# Patient Record
Sex: FEMALE | Employment: PART TIME | URBAN - METROPOLITAN AREA
[De-identification: names, ages, dates, MRNs, and addresses within clinical notes are randomized per-mention and may not be internally consistent; named-entity substitution may affect disease eponyms.]

---

## 2017-04-03 ENCOUNTER — TELEPHONE (OUTPATIENT)
Dept: FAMILY MEDICINE | Facility: OTHER | Age: 47
End: 2017-04-03

## 2017-04-03 NOTE — TELEPHONE ENCOUNTER
Summary:    Patient is due/failing the following:   PAP    Action needed:   Patient needs office visit for PAP.    Type of outreach:    Phone, left message for patient to call back.     Questions for provider review:    None                                                                                                                                    Annette Bernstein       Chart routed to Care Team .          Panel Management Review      Patient has the following on her problem list: None      Composite cancer screening  Chart review shows that this patient is due/due soon for the following Pap Smear

## 2017-04-18 ENCOUNTER — OFFICE VISIT (OUTPATIENT)
Dept: FAMILY MEDICINE | Facility: OTHER | Age: 47
End: 2017-04-18
Payer: COMMERCIAL

## 2017-04-18 VITALS
TEMPERATURE: 100.1 F | OXYGEN SATURATION: 97 % | RESPIRATION RATE: 20 BRPM | HEIGHT: 67 IN | HEART RATE: 90 BPM | DIASTOLIC BLOOD PRESSURE: 78 MMHG | BODY MASS INDEX: 41.59 KG/M2 | SYSTOLIC BLOOD PRESSURE: 110 MMHG | WEIGHT: 265 LBS

## 2017-04-18 DIAGNOSIS — R53.81 MALAISE: ICD-10-CM

## 2017-04-18 DIAGNOSIS — J20.9 ACUTE BRONCHITIS WITH SYMPTOMS > 10 DAYS: Primary | ICD-10-CM

## 2017-04-18 LAB
FLUAV+FLUBV AG SPEC QL: NEGATIVE
FLUAV+FLUBV AG SPEC QL: NORMAL
SPECIMEN SOURCE: NORMAL

## 2017-04-18 PROCEDURE — 87804 INFLUENZA ASSAY W/OPTIC: CPT | Performed by: PHYSICIAN ASSISTANT

## 2017-04-18 PROCEDURE — 99213 OFFICE O/P EST LOW 20 MIN: CPT | Performed by: PHYSICIAN ASSISTANT

## 2017-04-18 RX ORDER — BENZONATATE 200 MG/1
200 CAPSULE ORAL 3 TIMES DAILY PRN
Qty: 30 CAPSULE | Refills: 0 | Status: SHIPPED | OUTPATIENT
Start: 2017-04-18 | End: 2018-01-25

## 2017-04-18 RX ORDER — AZITHROMYCIN 250 MG/1
TABLET, FILM COATED ORAL
Qty: 6 TABLET | Refills: 0 | Status: SHIPPED | OUTPATIENT
Start: 2017-04-18 | End: 2018-01-25

## 2017-04-18 NOTE — PROGRESS NOTES
"  SUBJECTIVE:                                                    Jerrica Pappas is a 46 year old female who presents to clinic today for the following health issues:      HPI    Acute Illness   Acute illness concerns: Flu  Onset: friday    Fever: YES, 101    Chills/Sweats: YES    Headache (location?): YES    Sinus Pressure:YES    Conjunctivitis:  no    Ear Pain: no    Rhinorrhea: YES    Congestion: YES    Sore Throat: YES     Cough: YES-productive of brown sputum    Wheeze: YES    Decreased Appetite: YES    Nausea: no     Vomiting: no    Diarrhea:  YES- once    Dysuria/Freq.: no    Fatigue/Achiness: YES    Sick/Strep Exposure: YES- son had influenza     Therapies Tried and outcome: Advil - helps with fever and body aches       Problem list and histories reviewed & adjusted, as indicated.  Additional history: as documented    Labs reviewed in EPIC    ROS:  Constitutional, HEENT, cardiovascular, pulmonary, gi and gu systems are negative, except as otherwise noted.    OBJECTIVE:                                                    /78 (BP Location: Left arm, Patient Position: Chair, Cuff Size: Adult Large)  Pulse 90  Temp 100.1  F (37.8  C) (Oral)  Resp 20  Ht 5' 6.85\" (1.698 m)  Wt 265 lb (120.2 kg)  SpO2 97%  BMI 41.69 kg/m2  Body mass index is 41.69 kg/(m^2).  GENERAL APPEARANCE: ill appearing, alert and no distress  EYES: Eyes grossly normal to inspection, PERRLA, conjunctivae and sclerae without injection or discharge, EOM intact   HENT: Bilateral ear canals without erythema or cerumen, bilateral TM's pearly grey with normal light reflex, no effusion, injection, or bulging, nasal turbinates without swelling, erythema, or discharge, mouth without ulcers or lesions, oropharynx clear and oral mucous membranes moist, no sinus tenderness   NECK: Bilateral anterior cervical adenopathy. No adenopathy in posterior cervical or supraclavicular regions  RESP: Bronchial areas with diffuse rhonchi, remainder of " lungs clear to auscultation - no rales, rhonchi or wheezes    CV: Regular rates and rhythm, normal S1 S2, no S3 or S4, no murmur, click or rub  MS: No musculoskeletal defects are noted and gait is age appropriate without ataxia   SKIN: No suspicious lesions or rashes, hydration status appears adeuqate with normal skin turgor   PSYCH: Alert and oriented x3; speech- coherent , normal rate and volume; able to articulate logical thoughts, able to abstract reason, no tangential thoughts, no hallucinations or delusions, mentation appears normal, Mood is euthymic. Affect is appropriate for this mood state. Thought content is free of suicidal ideation, hallucinations, and delusions. Dress is adequate and upkept. Eye contact is good during conversation.       Diagnostic Test Results:  Results for orders placed or performed in visit on 04/18/17 (from the past 24 hour(s))   Influenza A/B antigen   Result Value Ref Range    Influenza A/B Agn Specimen Nasal     Influenza A Negative NEG    Influenza B  NEG     Negative   Test results must be correlated with clinical data. If necessary, results   should be confirmed by a molecular assay or viral culture.            ASSESSMENT/PLAN:                                                        ICD-10-CM    1. Acute bronchitis with symptoms > 10 days J20.9 azithromycin (ZITHROMAX) 250 MG tablet     benzonatate (TESSALON) 200 MG capsule   2. Malaise R53.81 Influenza A/B antigen     - Discussed lab findings   - Due to exam findings, will treat   - Discussed antibiotic use, duration, and side effects  - Discussed cough medication use and side effects  - Rest, fluids, and can continue Advil as needed   - Hand out given    The patient indicates understanding of these issues and agrees with the plan.    Follow up: CALLIE Wheeler PA-C  [facility]

## 2017-04-18 NOTE — PATIENT INSTRUCTIONS
Acute Bronchitis                  What is acute bronchitis?   Bronchitis is an infection of the air passages--that is, the tubes that connect the windpipe to the lungs. It causes swelling and irritation of the airways. With acute bronchitis you usually have a cough that produces phlegm and pain behind the breastbone when you breathe deeply or cough.   How does it occur?   Bronchitis often occurs with viral infections of the respiratory tract, such as colds and flu. Bronchitis may also be caused by bacterial infections. It may occur with childhood illnesses such as measles and whooping cough.   Attacks are most frequent during the winter or when the level of air pollution is high.   Infants, young children, older adults, smokers, and people with heart disease or lung disease (including asthma and allergies) are most likely to get acute bronchitis.   What are the symptoms?   Symptoms may include:   a deep cough that produces yellowish or greenish phlegm   pain behind the breastbone when you breathe deeply or cough   wheezing   feeling short of breath   fever   chills   headache   sore muscles.   How is it diagnosed?   Your healthcare provider will ask about your symptoms and examine you. You may have tests, such as:   a test of phlegm to look for bacteria   chest X-ray   blood tests.   How is it treated?   Acute bronchitis often does not require medical treatment. Resting at home and drinking plenty of fluids to keep the mucus loose may be all you need to do to get better in a few days. If your symptoms are severe or you have other health problems (such as heart or lung disease or diabetes), you may need to take antibiotics.   How long will the effects last?   Most of the time acute bronchitis clears up in a few days. Your cough may slowly get better in 1 to 2 weeks.   It may take you longer to recover if:   You are a smoker.   You live in an area where air pollution is a problem.   You have a heart  or lung disease.   You have any other continuing health problems.   How can I take care of myself?   You can help yourself by:   following the full treatment your healthcare provider recommends   using a vaporizer, humidifier, or steam from hot water to add moisture to the air   drinking plenty of liquids   taking cough medicine if recommended by your healthcare provider   resting in bed   taking aspirin or acetaminophen to reduce fever and relieve headache and muscle pain (Check with your healthcare provider before you give any medicine that contains aspirin or salicylates to a child or teen. This includes medicines like baby aspirin, some cold medicines, and Pepto Bismol. Children and teens who take aspirin are at risk for a serious illness called Reye's syndrome.)   eating healthy meals.   Call your healthcare provider if:   You have trouble breathing.   You have a fever of 101.5?F (38.6?C) or higher.   You cough up blood.   Your symptoms are getting worse instead of better.   You don't start to feel better after 3 days of treatment.   You have any symptoms that concern you.   How can I help prevent acute bronchitis?   To reduce your risk of getting a respiratory infection:   Do not smoke.   Wash your hands often, especially when you are around people with colds (upper respiratory infections).   If you have asthma or allergies, keep your symptoms under good control.   Get regular exercise.   Eat healthy foods.       Published by [employer].  This content is reviewed periodically and is subject to change as new health information becomes available. The information is intended to inform and educate and is not a replacement for medical evaluation, advice, diagnosis or treatment by a healthcare professional.   Developed by [employer].   ? 2010 [employer] and/or its affiliates. All Rights Reserved.   Copyright   [employer] 2011

## 2017-04-18 NOTE — LETTER
[address], [address]   [place]   [address]  Phone: [phone]      April 18, 2017            To Whom It May Concern:    RE:  Jerrica Pappas    Jerrica Pappas was seen and treated today at our clinic.  Due to an acute illness, patient is not to return to work until on antibiotics >24 hours and no fever.     Please contact me with any questions or concerns.    Sincerely,            Renetta Aldana-LEXIE Bertrand  April 18, 2017

## 2017-04-18 NOTE — NURSING NOTE
"No chief complaint on file.      Initial /78 (BP Location: Left arm, Patient Position: Chair, Cuff Size: Adult Large)  Pulse 90  Temp 100.1  F (37.8  C) (Oral)  Resp 20  Ht 5' 6.85\" (1.698 m)  Wt 265 lb (120.2 kg)  SpO2 97%  BMI 41.69 kg/m2 Estimated body mass index is 41.69 kg/(m^2) as calculated from the following:    Height as of this encounter: 5' 6.85\" (1.698 m).    Weight as of this encounter: 265 lb (120.2 kg).  Medication Reconciliation: complete  "

## 2017-04-20 ENCOUNTER — TELEPHONE (OUTPATIENT)
Dept: FAMILY MEDICINE | Facility: OTHER | Age: 47
End: 2017-04-20

## 2017-04-20 DIAGNOSIS — J20.9 ACUTE BRONCHITIS WITH SYMPTOMS > 10 DAYS: Primary | ICD-10-CM

## 2017-04-20 RX ORDER — CODEINE PHOSPHATE AND GUAIFENESIN 10; 100 MG/5ML; MG/5ML
2 SOLUTION ORAL EVERY 6 HOURS PRN
Qty: 120 ML | Refills: 0 | Status: SHIPPED | OUTPATIENT
Start: 2017-04-20 | End: 2018-01-25

## 2017-04-20 NOTE — LETTER
[address], [address]   [place]   [address]  Phone: [phone]      April 20, 2017            To Whom It May Concern:    RE:  Jerrica Pappas was seen and treated today at our clinic.  Due to an acute illness, the patient is not to return to work today (4/20/17) or tomorrow (4/21/17) due to symptoms and contagious etiology.         Please contact me with any questions or concerns.    Sincerely,            Renetta Wheeler PA-C  April 20, 2017

## 2017-04-20 NOTE — TELEPHONE ENCOUNTER
RX and letter written and placed in MA task. Excused her from work today and tomorrow.     Cedrick Wheeler PA-C  [facility]

## 2017-04-20 NOTE — TELEPHONE ENCOUNTER
[facility] phone call message- patient reporting a symptom:    Symptom or request: still not feeling well, still has the side pain     Duration (how long have symptoms been present): ongoing   Have you been treated for this before? Yes, on Tuesday   Additional comments: Zpack does not seem to be helping. Anything else she can do? Please advise.     Call taken on 4/20/2017 at 10:26 AM by Nury Mccullough

## 2017-04-20 NOTE — TELEPHONE ENCOUNTER
CDL: Patient called in stating her right sided pain has not improved since LOV 04/18/2017. Pain from her right ribcage down to the right hip, tender to the touch, worse with coughing. Wondering if she is able to get cough syrup with codeine to decrease the discomfort and a note excusing her from work with a date of when she can return. Please review and advise.     Jerrica Pappas is a 46 year old female who calls with side pain .    NURSING ASSESSMENT:  Description:  Having right side pain with her cough. Taking ibuprofen 600mg three times a day. Right ribcage to her hip is painful and tender to the touch and worse with coughing. Stated it is worse when you apply pressure. Cough is improving. Denies feeling faint, lightheaded, short of breath, nausea, vomiting, fevers. Patient is wondering if she is able to get a cough syrup with codeine to decrease the pain and a note stating when she should return to work. She does not feel that she can go to work tomorrow due to the pain.   Onset/duration:  Last Friday  Pain scale (0-10)   5-10/10 tender to the touch, painful 5/10 while taking ibuprofen and 10/10 when medication wears off  LMP/preg/breast feeding:  No LMP recorded. Patient has had an implant.  Last exam/Treatment:  04/18/2017  Allergies: No Known Allergies    NURSING PLAN: Routed to provider Yes    RECOMMENDED DISPOSITION:  Home care advice - per abdominal pain protocol  Will comply with recommendation: Yes  If further questions/concerns or if symptoms do not improve, worsen or new symptoms develop, call your PCP or [facility] as soon as possible.    NOTES:  Disposition was determined by the first positive assessment question, therefore all previous assessment questions were negative    Guideline used:  Telephone Triage Protocols for Nurses, Fourth Edition, Kadi Lott  Abdominal Pain  Nursing Judgment  Routed to Provider    Ana Hidalgo RN

## 2017-07-08 ENCOUNTER — HEALTH MAINTENANCE LETTER (OUTPATIENT)
Age: 47
End: 2017-07-08

## 2017-07-18 ENCOUNTER — TELEPHONE (OUTPATIENT)
Dept: FAMILY MEDICINE | Facility: OTHER | Age: 47
End: 2017-07-18

## 2017-07-18 NOTE — TELEPHONE ENCOUNTER
Summary:    Patient is due/failing the following:   PAP    Action needed:   Patient needs office visit for PAP.    Type of outreach:    Phone, spoke to patient.  patient declines at this time due to not having insurance. Patient will call to see if she is able to get coverage for the [place] program     Questions for provider review:    None                                                                                                                                    Annette Bernstein       Chart routed to Care Team .      Panel Management Review      Patient has the following on her problem list: None      Composite cancer screening  Chart review shows that this patient is due/due soon for the following Pap Smear

## 2017-08-31 ENCOUNTER — TELEPHONE (OUTPATIENT)
Dept: FAMILY MEDICINE | Facility: OTHER | Age: 47
End: 2017-08-31

## 2017-08-31 DIAGNOSIS — E03.9 ACQUIRED HYPOTHYROIDISM: ICD-10-CM

## 2017-08-31 RX ORDER — LEVOTHYROXINE SODIUM 112 UG/1
112 TABLET ORAL DAILY
Qty: 30 TABLET | Refills: 0 | Status: SHIPPED | OUTPATIENT
Start: 2017-08-31 | End: 2017-11-08

## 2017-08-31 NOTE — TELEPHONE ENCOUNTER
Reason for call:  Medication  Reason for Call:  Medication or medication refill:    Do you use a [facility]?  Name of the pharmacy and phone number for the current request:  [facility]    Name of the medication requested: levothyroxine (SYNTHROID, LEVOTHROID) 112 MCG tablet    Other request: na    Can we leave a detailed message on this number? YES    Phone number patient can be reached at: Home number on file [phone] (home)    Best Time: anytime     Call taken on 8/31/2017 at 1:19 PM by Raquel Gunn

## 2017-08-31 NOTE — TELEPHONE ENCOUNTER
Medication is being filled for 1 time refill only due to:  Patient needs labs TSH.     Orders placed.  Please call pt to schedule a lab only appointment.    Gisel Scott RN

## 2017-08-31 NOTE — TELEPHONE ENCOUNTER
PHARMACY NOTE: SECOND REQUEST      levothyroxine (SYNTHROID, LEVOTHROID) 112 MCG tablet     Last Written Prescription Date: 8/11/16  Last Quantity: 90, # refills: 3  Last Office Visit with [facility], P or [facility] prescribing provider: 4/18/17        TSH   Date Value Ref Range Status   07/28/2016 1.69 0.40 - 4.00 mU/L Final

## 2017-09-01 NOTE — TELEPHONE ENCOUNTER
Spoke with pt and told her refill was sent yesterday to the pharmacy but is due for repeat labs prior to next refill. Pt is scheduled for labs next week.    Almaz Wilkinson CMA ([facility])

## 2017-09-07 DIAGNOSIS — E03.9 ACQUIRED HYPOTHYROIDISM: ICD-10-CM

## 2017-09-07 LAB — TSH SERPL DL<=0.005 MIU/L-ACNC: 3.47 MU/L (ref 0.4–4)

## 2017-09-07 PROCEDURE — 84443 ASSAY THYROID STIM HORMONE: CPT | Performed by: PHYSICIAN ASSISTANT

## 2017-09-07 PROCEDURE — 36415 COLL VENOUS BLD VENIPUNCTURE: CPT | Performed by: PHYSICIAN ASSISTANT

## 2017-11-08 ENCOUNTER — TELEPHONE (OUTPATIENT)
Dept: FAMILY MEDICINE | Facility: OTHER | Age: 47
End: 2017-11-08

## 2017-11-08 DIAGNOSIS — E03.9 ACQUIRED HYPOTHYROIDISM: ICD-10-CM

## 2017-11-08 RX ORDER — LEVOTHYROXINE SODIUM 112 UG/1
TABLET ORAL
Qty: 30 TABLET | Refills: 0 | Status: SHIPPED | OUTPATIENT
Start: 2017-11-08 | End: 2018-01-25

## 2017-11-08 NOTE — TELEPHONE ENCOUNTER
levothyroxine (SYNTHROID/LEVOTHROID) 112 MCG tablet  Medication is being filled for 1 time refill only due to:  Patient needs to be seen because due for physical.      TSH   Date Value Ref Range Status   09/07/2017 3.47 0.40 - 4.00 mU/L Final   ]  Due for yearly physical, please assist with scheduling.   Ana Hidalgo RN, BSN

## 2017-11-16 ENCOUNTER — TELEPHONE (OUTPATIENT)
Dept: FAMILY MEDICINE | Facility: OTHER | Age: 47
End: 2017-11-16

## 2017-11-16 NOTE — TELEPHONE ENCOUNTER
Summary:    Patient is due/failing the following:   MAMMOGRAM, PAP and PHYSICAL    Action needed:   Patient needs office visit for PAP/PE. and schedule a mammogram     Type of outreach:    Phone, spoke to patient.  patient has no insurance   Patient was given price estimate number   Questions for provider review:    None                                                                                                                                    Annette Bernstein       Chart routed to Care Team .      Panel Management Review      Patient has the following on her problem list: None      Composite cancer screening  Chart review shows that this patient is due/due soon for the following Pap Smear and Mammogram

## 2018-01-19 NOTE — PROGRESS NOTES
SUBJECTIVE:   CC: Jerrica Pappas is an 47 year old woman who presents for preventive health visit.     Fasting labs first please -CDL       Physical   Annual:     Getting at least 3 servings of Calcium per day::  Yes    Bi-annual eye exam::  Yes    Dental care twice a year::  Yes    Sleep apnea or symptoms of sleep apnea::  None    Diet::  Regular (no restrictions)    Frequency of exercise::  2-3 days/week    Duration of exercise::  15-30 minutes    Taking medications regularly::  Yes    Medication side effects::  None    Additional concerns today::  YES (would like to have A1c checked today, sisters have both been dx'ed with DM)              Hypothyroidism Follow-up      Since last visit, patient describes the following symptoms: weight gain of 10-15 lbs      Today's PHQ-2 Score:   PHQ-2 ( 1999 Pfizer) 12/14/2016   Q1: Little interest or pleasure in doing things 0   Q2: Feeling down, depressed or hopeless 0   PHQ-2 Score 0       Abuse: Current or Past(Physical, Sexual or Emotional)- No  Do you feel safe in your environment - Yes    Social History   Substance Use Topics     Smoking status: Never Smoker     Smokeless tobacco: Never Used     Alcohol use No     No flowsheet data found.No flowsheet data found.    Reviewed orders with patient.  Reviewed health maintenance and updated orders accordingly - Yes  Labs reviewed in EPIC  BP Readings from Last 3 Encounters:   04/18/17 110/78   12/14/16 118/70   11/28/16 118/70    Wt Readings from Last 3 Encounters:   04/18/17 265 lb (120.2 kg)   12/14/16 264 lb (119.7 kg)   11/28/16 266 lb 6.4 oz (120.8 kg)            Patient under age 50, mutual decision reflected in health maintenance.      Pertinent mammograms are reviewed under the imaging tab.  History of abnormal Pap smear: NO - age 30-65 PAP every 5 years with negative HPV co-testing recommended    Reviewed and updated as needed this visit by clinical staff  Tobacco  Allergies  Meds  Problems  Med Hx  Surg Hx   "Fam Hx  Soc Hx          Reviewed and updated as needed this visit by Provider  Allergies  Meds  Problems        Past Medical History:   Diagnosis Date     Thyroid disorder       Past Surgical History:   Procedure Laterality Date     LAPAROTOMY MINI, TUBAL LIGATION, COMBINED       OTHER SURGICAL HISTORY      cyst on ears        Review of Systems     OBJECTIVE:   /82  Pulse 74  Temp 98.4  F (36.9  C) (Oral)  Resp 12  Ht 5' 6.81\" (1.697 m)  Wt 274 lb (124.3 kg)  SpO2 99%  BMI 43.16 kg/m2  Physical Exam   Constitutional: She is oriented to person, place, and time. She appears well-developed and well-nourished. No distress.   HENT:   Right Ear: External ear normal.   Left Ear: External ear normal.   Nose: Nose normal.   Mouth/Throat: Oropharynx is clear and moist. No oropharyngeal exudate.   Eyes: Conjunctivae are normal. Pupils are equal, round, and reactive to light. Right eye exhibits no discharge. Left eye exhibits no discharge.   Neck: Neck supple. No tracheal deviation present. No thyromegaly present.   Cardiovascular: Normal rate, regular rhythm, S1 normal, S2 normal, normal heart sounds and normal pulses.  Exam reveals no S3, no S4 and no friction rub.    No murmur heard.  Pulmonary/Chest: Effort normal and breath sounds normal. No respiratory distress. She has no wheezes. She has no rales.   Abdominal: Soft. Bowel sounds are normal. She exhibits no mass. There is no hepatosplenomegaly. There is no tenderness.   Genitourinary: Vagina normal and uterus normal. No breast swelling, tenderness or discharge. Cervix exhibits no motion tenderness and no discharge. No vaginal discharge found.   Musculoskeletal: Normal range of motion. She exhibits no edema.   Lymphadenopathy:     She has no cervical adenopathy.   Neurological: She is alert and oriented to person, place, and time. She has normal strength and normal reflexes. She exhibits normal muscle tone.   Skin: Skin is warm and dry. No rash noted. " "  Psychiatric: She has a normal mood and affect. Judgment and thought content normal. Cognition and memory are normal.     - Exam limited due to body habitus, especially abdominal and pelvic bimanual        ASSESSMENT/PLAN:       ICD-10-CM    1. Encounter for routine adult health examination without abnormal findings Z00.00    2. Visit for screening mammogram Z12.31 MA SCREENING DIGITAL BILAT - Future  (s+30)   3. Screening for malignant neoplasm of cervix Z12.4 Pap imaged thin layer screen with HPV - recommended age 30 - 65 years (select HPV order below)     HPV High Risk Types DNA Cervical   4. Need for prophylactic vaccination and inoculation against influenza Z23    5. Acquired hypothyroidism E03.9 TSH with free T4 reflex   6. CARDIOVASCULAR SCREENING; LDL GOAL LESS THAN 160 Z13.6 GLUCOSE     Lipid panel reflex to direct LDL Fasting   7. Obesity, Class III, BMI 40-49.9 (morbid obesity) (H) E66.01 GLUCOSE     Lipid panel reflex to direct LDL Fasting     - Discussed measuring pulse for exercise, going only for leisurely stroll and not actually \"exercising\"   - Discussed Choose My Plate and Hand out Given   - Referral to nutrition placed for diet help   - Lab today, await results   - Patient to schedule mammogram       COUNSELING:  Reviewed preventive health counseling, as reflected in patient instructions  Special attention given to:        Regular exercise       Healthy diet/nutrition       Immunizations    Declined: Influenza due to Concerns about side effects/safety             reports that she has never smoked. She has never used smokeless tobacco.    Estimated body mass index is 41.69 kg/(m^2) as calculated from the following:    Height as of 4/18/17: 5' 6.85\" (1.698 m).    Weight as of 4/18/17: 265 lb (120.2 kg).   Weight management plan: Discussed healthy diet and exercise guidelines and patient will follow up in 12 months in clinic to re-evaluate.    Counseling Resources:  ATP IV Guidelines  Pooled Cohorts " Equation Calculator  Breast Cancer Risk Calculator  FRAX Risk Assessment  ICSI Preventive Guidelines  Dietary Guidelines for Americans, 2010  USDA's MyPlate  ASA Prophylaxis  Lung CA Screening        Renetta Aldana-LEXIE Bertrand  [facility]

## 2018-01-19 NOTE — PATIENT INSTRUCTIONS
- Schedule mammogram           Preventive Health Recommendations  Female Ages 40 to 49    Yearly exam:     See your health care provider every year in order to  1. Review health changes.   2. Discuss preventive care.    3. Review your medicines if your doctor prescribed any.      Get a Pap test every three years (unless you have an abnormal result and your provider advises testing more often).      If you get Pap tests with HPV test, you only need to test every 5 years, unless you have an abnormal result. You do not need a Pap test if your uterus was removed (hysterectomy) and you have not had cancer.      You should be tested each year for STDs (sexually transmitted diseases), if you're at risk.       Ask your doctor if you should have a mammogram.      Have a colonoscopy (test for colon cancer) if someone in your family has had colon cancer or polyps before age 50.       Have a cholesterol test every 5 years.       Have a diabetes test (fasting glucose) after age 45. If you are at risk for diabetes, you should have this test every 3 years.    Shots: Get a flu shot each year. Get a tetanus shot every 10 years.     Nutrition:     Eat at least 5 servings of fruits and vegetables each day.    Eat whole-grain bread, whole-wheat pasta and brown rice instead of white grains and rice.    Talk to your provider about Calcium and Vitamin D.     Lifestyle    Exercise at least 150 minutes a week (an average of 30 minutes a day, 5 days a week). This will help you control your weight and prevent disease.    Limit alcohol to one drink per day.    No smoking.     Wear sunscreen to prevent skin cancer.    See your dentist every six months for an exam and cleaning.        Exercise: Measuring Your Pace  Getting your heart to work at the right pace is important. It means you ll develop better aerobic endurance. This happens because your heart gets stronger and more efficient from the challenge. A stronger heart can pump more oxygen  to your muscles. Then you don t get tired as quickly when doing hobbies, sports, or daily activities.    How does it feel?  Listening to your body is the best way to decide if an exercise pace is right for you. If you do too much, you ll get tired too quickly or get hurt. If you do too little, you won t get the health rewards you want.  Note: During or after exercise, you should not feel sick, dizzy, or very tired. After you re done, you should feel normal again in about 10 minutes. If you re very tired or very sore the next day, you may have exercised too hard.  Your target heart rate  When you exercise, you should keep your heart rate within a safe range. This is called your target heart rate range. Your heart rate is measured by taking your pulse. (If you don t know how to take your pulse, ask your healthcare provider to show you how.) Take your pulse often as you exercise. This helps to make sure you re within your target heart rate range. When you exercise at the right heart rate, you will get the most out of your exercise safely.  Finding your target heart rate  Your target heart rate is based on your age and health. It is important to ask your healthcare provider how much exercise is safe for you. If you re healthy, you can use the chart to find your target heart rate range. If your heart rate goes too high, slow down until you re back in your range.     Age  ?(Years) Target Heart   Rate Range  (Beats per minute) Maximum  Heart Rate  (Do not exceed)   20 100-150 200   25  195   30  190   35  185   40  180   45  175   50  170   55  165   60  160   Date Last Reviewed: 8/13/2015 2000-2017 [employer]. [address] [place] [address], [place] [address]. All rights reserved. This information is not intended as a substitute for professional medical care. Always follow your healthcare professional's instructions.

## 2018-01-25 ENCOUNTER — OFFICE VISIT (OUTPATIENT)
Dept: FAMILY MEDICINE | Facility: OTHER | Age: 48
End: 2018-01-25

## 2018-01-25 VITALS
SYSTOLIC BLOOD PRESSURE: 116 MMHG | BODY MASS INDEX: 43 KG/M2 | HEART RATE: 74 BPM | TEMPERATURE: 98.4 F | DIASTOLIC BLOOD PRESSURE: 82 MMHG | WEIGHT: 274 LBS | RESPIRATION RATE: 12 BRPM | HEIGHT: 67 IN | OXYGEN SATURATION: 99 %

## 2018-01-25 DIAGNOSIS — Z12.4 SCREENING FOR MALIGNANT NEOPLASM OF CERVIX: ICD-10-CM

## 2018-01-25 DIAGNOSIS — Z23 NEED FOR PROPHYLACTIC VACCINATION AND INOCULATION AGAINST INFLUENZA: ICD-10-CM

## 2018-01-25 DIAGNOSIS — Z12.31 VISIT FOR SCREENING MAMMOGRAM: ICD-10-CM

## 2018-01-25 DIAGNOSIS — Z13.6 CARDIOVASCULAR SCREENING; LDL GOAL LESS THAN 160: ICD-10-CM

## 2018-01-25 DIAGNOSIS — Z00.00 ENCOUNTER FOR ROUTINE ADULT HEALTH EXAMINATION WITHOUT ABNORMAL FINDINGS: Primary | ICD-10-CM

## 2018-01-25 DIAGNOSIS — E03.9 ACQUIRED HYPOTHYROIDISM: ICD-10-CM

## 2018-01-25 DIAGNOSIS — E66.01 MORBID OBESITY (H): ICD-10-CM

## 2018-01-25 DIAGNOSIS — Z83.3 FAMILY HISTORY OF DIABETES MELLITUS: ICD-10-CM

## 2018-01-25 LAB
CHOLEST SERPL-MCNC: 113 MG/DL
GLUCOSE SERPL-MCNC: 93 MG/DL (ref 70–99)
HBA1C MFR BLD: 5.9 % (ref 4.3–6)
HDLC SERPL-MCNC: 46 MG/DL
LDLC SERPL CALC-MCNC: 61 MG/DL
NONHDLC SERPL-MCNC: 67 MG/DL
TRIGL SERPL-MCNC: 31 MG/DL
TSH SERPL DL<=0.005 MIU/L-ACNC: 3.81 MU/L (ref 0.4–4)

## 2018-01-25 PROCEDURE — 99386 PREV VISIT NEW AGE 40-64: CPT | Performed by: PHYSICIAN ASSISTANT

## 2018-01-25 PROCEDURE — 83036 HEMOGLOBIN GLYCOSYLATED A1C: CPT | Performed by: PHYSICIAN ASSISTANT

## 2018-01-25 PROCEDURE — 84443 ASSAY THYROID STIM HORMONE: CPT | Performed by: PHYSICIAN ASSISTANT

## 2018-01-25 PROCEDURE — G0145 SCR C/V CYTO,THINLAYER,RESCR: HCPCS | Performed by: PHYSICIAN ASSISTANT

## 2018-01-25 PROCEDURE — 36415 COLL VENOUS BLD VENIPUNCTURE: CPT | Performed by: PHYSICIAN ASSISTANT

## 2018-01-25 PROCEDURE — 87624 HPV HI-RISK TYP POOLED RSLT: CPT | Performed by: PHYSICIAN ASSISTANT

## 2018-01-25 PROCEDURE — 82947 ASSAY GLUCOSE BLOOD QUANT: CPT | Performed by: PHYSICIAN ASSISTANT

## 2018-01-25 PROCEDURE — 80061 LIPID PANEL: CPT | Performed by: PHYSICIAN ASSISTANT

## 2018-01-25 RX ORDER — LEVOTHYROXINE SODIUM 112 UG/1
112 TABLET ORAL DAILY
Qty: 90 TABLET | Refills: 3 | Status: SHIPPED | OUTPATIENT
Start: 2018-01-25 | End: 2019-03-08

## 2018-01-25 NOTE — NURSING NOTE
"Chief Complaint   Patient presents with     Physical     Panel Management     mychart, height, flu, pap, mammo       Initial /82  Pulse 74  Temp 98.4  F (36.9  C) (Oral)  Resp 12  Ht 5' 6.81\" (1.697 m)  Wt 274 lb (124.3 kg)  SpO2 99%  BMI 43.16 kg/m2 Estimated body mass index is 43.16 kg/(m^2) as calculated from the following:    Height as of this encounter: 5' 6.81\" (1.697 m).    Weight as of this encounter: 274 lb (124.3 kg).  Medication Reconciliation: complete  "

## 2018-01-25 NOTE — MR AVS SNAPSHOT
After Visit Summary   1/25/2018    Jerrica Pappas    MRN: [id number]           Patient Information     Date Of Birth          1970        Visit Information        Provider Department      1/25/2018 9:00 AM Renetta Wheeler PA-C [facility]        Today's Diagnoses     Encounter for routine adult health examination without abnormal findings    -  1    Visit for screening mammogram        Screening for malignant neoplasm of cervix        Need for prophylactic vaccination and inoculation against influenza        Acquired hypothyroidism        CARDIOVASCULAR SCREENING; LDL GOAL LESS THAN 160        Morbid obesity (H)        Family history of diabetes mellitus          Care Instructions      - Schedule mammogram           Preventive Health Recommendations  Female Ages 40 to 49    Yearly exam:     See your health care provider every year in order to  1. Review health changes.   2. Discuss preventive care.    3. Review your medicines if your doctor prescribed any.      Get a Pap test every three years (unless you have an abnormal result and your provider advises testing more often).      If you get Pap tests with HPV test, you only need to test every 5 years, unless you have an abnormal result. You do not need a Pap test if your uterus was removed (hysterectomy) and you have not had cancer.      You should be tested each year for STDs (sexually transmitted diseases), if you're at risk.       Ask your doctor if you should have a mammogram.      Have a colonoscopy (test for colon cancer) if someone in your family has had colon cancer or polyps before age 50.       Have a cholesterol test every 5 years.       Have a diabetes test (fasting glucose) after age 45. If you are at risk for diabetes, you should have this test every 3 years.    Shots: Get a flu shot each year. Get a tetanus shot every 10 years.     Nutrition:     Eat at least 5 servings of fruits and vegetables each  day.    Eat whole-grain bread, whole-wheat pasta and brown rice instead of white grains and rice.    Talk to your provider about Calcium and Vitamin D.     Lifestyle    Exercise at least 150 minutes a week (an average of 30 minutes a day, 5 days a week). This will help you control your weight and prevent disease.    Limit alcohol to one drink per day.    No smoking.     Wear sunscreen to prevent skin cancer.    See your dentist every six months for an exam and cleaning.        Exercise: Measuring Your Pace  Getting your heart to work at the right pace is important. It means you ll develop better aerobic endurance. This happens because your heart gets stronger and more efficient from the challenge. A stronger heart can pump more oxygen to your muscles. Then you don t get tired as quickly when doing hobbies, sports, or daily activities.    How does it feel?  Listening to your body is the best way to decide if an exercise pace is right for you. If you do too much, you ll get tired too quickly or get hurt. If you do too little, you won t get the health rewards you want.  Note: During or after exercise, you should not feel sick, dizzy, or very tired. After you re done, you should feel normal again in about 10 minutes. If you re very tired or very sore the next day, you may have exercised too hard.  Your target heart rate  When you exercise, you should keep your heart rate within a safe range. This is called your target heart rate range. Your heart rate is measured by taking your pulse. (If you don t know how to take your pulse, ask your healthcare provider to show you how.) Take your pulse often as you exercise. This helps to make sure you re within your target heart rate range. When you exercise at the right heart rate, you will get the most out of your exercise safely.  Finding your target heart rate  Your target heart rate is based on your age and health. It is important to ask your healthcare provider how much  exercise is safe for you. If you re healthy, you can use the chart to find your target heart rate range. If your heart rate goes too high, slow down until you re back in your range.     Age  ?(Years) Target Heart   Rate Range  (Beats per minute) Maximum  Heart Rate  (Do not exceed)   20 100-150 200   25  195   30  190   35  185   40  180   45  175   50  170   55  165   60  160   Date Last Reviewed: 8/13/2015 2000-2017 [employer]. [address] [place] [address]. All rights reserved. This information is not intended as a substitute for professional medical care. Always follow your healthcare professional's instructions.                Follow-ups after your visit        Additional Services     NUTRITION REFERRAL       Your provider has referred you to: PREFERRED PROVIDERS:  [facility]: [facility] [phone]   [email or web address]/    Please be aware that coverage of these services is subject to the terms and limitations of your health insurance plan.  Call member services at your health plan with any benefit or coverage questions.      Please bring the following with you to your appointment:    (1) This referral request  (2) Any documents given to you regarding this referral  (3) Any specific questions you have about diet and/or food choices                  Future tests that were ordered for you today     Open Future Orders        Priority Expected Expires Ordered    MA SCREENING DIGITAL BILAT - Future  (s+30) Routine  1/19/2019 1/25/2018            Who to contact     If you have questions or need follow up information about today's clinic visit or your schedule please contact [facility] directly at [phone].  Normal or non-critical lab and imaging results will be communicated to you by MyChart, letter or phone within 4 business days after the clinic has received the  "results. If you do not hear from us within 7 days, please contact the clinic through [employer] or phone. If you have a critical or abnormal lab result, we will notify you by phone as soon as possible.  Submit refill requests through [employer] or call your pharmacy and they will forward the refill request to us. Please allow 3 business days for your refill to be completed.          Additional Information About Your Visit        [employer]ful Information     [employer] lets you send messages to your doctor, view your test results, renew your prescriptions, schedule appointments and more. To sign up, go to [email or web address] . Click on \"Log in\" on the left side of the screen, which will take you to the Welcome page. Then click on \"Sign up Now\" on the right side of the page.     You will be asked to enter the access code listed below, as well as some personal information. Please follow the directions to create your username and password.     Your access code is: [email or web address]  Expires: 2018  9:49 AM     Your access code will  in 90 days. If you need help or a new code, please call your [facility] clinic or [phone].        Care EveryWhere ID     This is your Care EveryWhere ID. This could be used by other organizations to access your [facility] medical records  [id number]        Your Vitals Were     Pulse Temperature Respirations Height Pulse Oximetry BMI (Body Mass Index)    74 98.4  F (36.9  C) (Oral) 12 5' 6.81\" (1.697 m) 99% 43.16 kg/m2       Blood Pressure from Last 3 Encounters:   18 116/82   17 110/78   16 118/70    Weight from Last 3 Encounters:   18 274 lb (124.3 kg)   17 265 lb (120.2 kg)   16 264 lb (119.7 kg)              We Performed the Following     GLUCOSE     Hemoglobin A1c     HPV High Risk Types DNA Cervical     Lipid panel reflex to direct LDL Fasting     NUTRITION REFERRAL     Pap imaged thin layer screen with HPV - recommended age 30 - 65 years (select HPV " order below)     TSH with free T4 reflex          Today's Medication Changes          These changes are accurate as of 1/25/18  9:49 AM.  If you have any questions, ask your nurse or doctor.               These medicines have changed or have updated prescriptions.        Dose/Directions    levothyroxine 112 MCG tablet   Commonly known as:  SYNTHROID/LEVOTHROID   This may have changed:  See the new instructions.   Used for:  Acquired hypothyroidism   Changed by:  Renetta Wheeler PA-C        Dose:  112 mcg   Take 1 tablet (112 mcg) by mouth daily   Quantity:  90 tablet   Refills:  3            Where to get your medicines      These medications were sent to [facility] [address] [place] [address] [facility] [address] [facility] [place] [facility] [address]  [address] [facility], [facility] [address]     Phone:  [phone]     levothyroxine 112 MCG tablet                Primary Care Provider Office Phone # Fax #    Renetta Wheeler PA-C [phone]       [address] [place] [address]   [facility] [address]        Equal Access to Services     [facility]: Hadii tae ku hadasho Soomaali, waaxda luqadaha, qaybta kaalmada adeegyafigueroa, guzman gómez . So [facility] [phone].    ATENCIÓN: Si habla español, tiene a blair disposición servicios gratuitos de asistencia lingüística. [facility] [phone].    We comply with applicable federal civil rights laws and [place] laws. We do not discriminate on the basis of race, color, national origin, age, disability, sex, sexual orientation, or gender identity.            Thank you!     Thank you for choosing [facility]  for your care. Our goal is always to provide you with excellent care. Hearing back from our patients is one way we can continue to improve our services. Please take a few minutes to complete the written survey that you may receive in the mail after your visit with us. Thank you!             Your Updated  Medication List - Protect others around you: Learn how to safely use, store and throw away your medicines at www.disposemymeds.org.          This list is accurate as of 1/25/18  9:49 AM.  Always use your most recent med list.                   Brand Name Dispense Instructions for use Diagnosis    levothyroxine 112 MCG tablet    SYNTHROID/LEVOTHROID    90 tablet    Take 1 tablet (112 mcg) by mouth daily    Acquired hypothyroidism

## 2018-01-25 NOTE — LETTER
February 7, 2018    Jerrica Pappas  [address] [place] [id number]    Dear Jerrica,  We are happy to inform you that your PAP smear result from 1/25/18 is normal.  We are now able to do a follow up test on PAP smears. The DNA test is for HPV (Human Papilloma Virus). Cervical cancer is closely linked with certain types of HPV. Your result showed no evidence of high risk HPV.  Therefore we recommend you return in 5 years for your next pap smear and HPV test.  You will still need to return to the clinic every year for an annual exam and other preventive tests.  Please contact the clinic at [phone] with any questions.  Sincerely,    Renetta Wheeler PA-C/archie

## 2018-01-29 LAB
COPATH REPORT: NORMAL
PAP: NORMAL

## 2018-01-30 LAB
FINAL DIAGNOSIS: NORMAL
HPV HR 12 DNA CVX QL NAA+PROBE: NEGATIVE
HPV16 DNA SPEC QL NAA+PROBE: NEGATIVE
HPV18 DNA SPEC QL NAA+PROBE: NEGATIVE
SPECIMEN DESCRIPTION: NORMAL
SPECIMEN SOURCE CVX/VAG CYTO: NORMAL

## 2018-08-29 ENCOUNTER — RADIANT APPOINTMENT (OUTPATIENT)
Dept: MAMMOGRAPHY | Facility: OTHER | Age: 48
End: 2018-08-29
Attending: PHYSICIAN ASSISTANT
Payer: COMMERCIAL

## 2018-08-29 DIAGNOSIS — Z12.31 VISIT FOR SCREENING MAMMOGRAM: ICD-10-CM

## 2018-08-29 PROCEDURE — 77067 SCR MAMMO BI INCL CAD: CPT | Mod: TC

## 2018-09-25 ENCOUNTER — OFFICE VISIT (OUTPATIENT)
Dept: OBGYN | Facility: OTHER | Age: 48
End: 2018-09-25
Payer: COMMERCIAL

## 2018-09-25 VITALS
HEART RATE: 72 BPM | BODY MASS INDEX: 43.12 KG/M2 | WEIGHT: 273.75 LBS | SYSTOLIC BLOOD PRESSURE: 120 MMHG | DIASTOLIC BLOOD PRESSURE: 76 MMHG

## 2018-09-25 DIAGNOSIS — Z30.46 NEXPLANON REMOVAL: Primary | ICD-10-CM

## 2018-09-25 PROCEDURE — 11982 REMOVE DRUG IMPLANT DEVICE: CPT | Performed by: ADVANCED PRACTICE MIDWIFE

## 2018-09-25 NOTE — PROGRESS NOTES
Jerrica Pappas is a 48 year old who presents to the clinic for removal of her nexplanon.   Uses TL for birth control and nexplanon for cycle control.  Has done an excellent job, no menses since insertion.   Is not interested in reinsertion.   Discussed average age at menopause is 52.  She prefers to wait to see what her menses are like before deciding on reinsertion.         Histories reviewed and updated  Past Medical History:   Diagnosis Date     Thyroid disorder      Past Surgical History:   Procedure Laterality Date     LAPAROTOMY MINI, TUBAL LIGATION, COMBINED       OTHER SURGICAL HISTORY      cyst on ears      Social History     Social History     Marital status:      Spouse name: N/A     Number of children: N/A     Years of education: N/A     Occupational History     Not on file.     Social History Main Topics     Smoking status: Never Smoker     Smokeless tobacco: Never Used     Alcohol use No     Drug use: No     Sexual activity: Yes     Partners: Male     Birth control/ protection: Surgical, Implant      Comment: tubal     Other Topics Concern     Not on file     Social History Narrative     Family History   Problem Relation Age of Onset     Hypertension Father      Psychotic Disorder Brother      ?schizophrenia, better on meds               EXAM:  /76 (BP Location: Right arm, Patient Position: Sitting, Cuff Size: Adult Large)  Pulse 72  Wt 273 lb 12 oz (124.2 kg)  BMI 43.12 kg/m2    The Nexplanon jaspreet was located in the left  arm.  The distal and proximal ends of the jaspreet were located and marked with a marking pen and the area cleansed with betadine. Approximately 3 cc's of 1% lidocaine with epinephrine was injected into the area under the Nexplanon jaspreet and a small skin incision made using a #11 blade.  The Nexplanon was gently manipulated until the tip was at the incision. The jaspreet tip was grasped with a  Carole clamp and was gently removed from the incision.  Both jaspreet tips were inspected  following removal and found to be completely intact.  The incision site was hemostatic and a steri-strip applied to the area along with a small pressure dressing.       ASSESSMENT/PLAN:    (Z30.46) Nexplanon removal  (primary encounter diagnosis)  Comment:   Plan:     RTC prn for reinsertion if menses resume.

## 2018-09-25 NOTE — MR AVS SNAPSHOT
After Visit Summary   9/25/2018    Jerrica Pappas    MRN: [id number]           Patient Information     Date Of Birth          1970        Visit Information        Provider Department      9/25/2018 2:15 PM Elida Farmer APRN CNM [facility]        Today's Diagnoses     Nexplanon removal    -  1      Care Instructions    Leave the pressure dressing in place for 4-6 hours.  If you feel the dressing is too tight loosen it or remove it completely.  Leave the Band-Aid in place for 24 hours.  Change it if wet.   Leave the steri strip in place until it falls off by itself.  Call the clinic with fever, chills or bleeding from the site.   Use a back up method of birth control such as condoms or abstain from intercourse for 7 days.   Call the clinic for bleeding that is heavier than a normal period for you or if you experience severe pelvic pain.              Follow-ups after your visit        Who to contact     If you have questions or need follow up information about today's clinic visit or your schedule please contact [facility] directly at [phone].  Normal or non-critical lab and imaging results will be communicated to you by MyChart, letter or phone within 4 business days after the clinic has received the results. If you do not hear from us within 7 days, please contact the clinic through MyChart or phone. If you have a critical or abnormal lab result, we will notify you by phone as soon as possible.  Submit refill requests through [employer] or call your pharmacy and they will forward the refill request to us. Please allow 3 business days for your refill to be completed.          Additional Information About Your Visit        Care EveryWhere ID     This is your Care EveryWhere ID. This could be used by other organizations to access your [facility] medical records  [id number]        Your Vitals Were     Pulse BMI (Body Mass Index)                72 43.12 kg/m2            Blood Pressure from Last 3 Encounters:   09/25/18 120/76   01/25/18 116/82   04/18/17 110/78    Weight from Last 3 Encounters:   09/25/18 273 lb 12 oz (124.2 kg)   01/25/18 274 lb (124.3 kg)   04/18/17 265 lb (120.2 kg)              We Performed the Following     REMOVAL [facility]        Primary Care Provider Office Phone # Fax #    Renetta MYRICK LEXIE Wheeler [phone]       [address] [facility] [address]  [facility] [address]        Equal Access to Services     [facility]: Hadii tae robles hadasho Soomaali, waaxda luqadaha, qaybta kaalmada adejsyada, guzman gómez . So [facility] [phone].    ATENCIÓN: Si habla español, tiene a blair disposición servicios gratuitos de asistencia lingüística. [facility] [phone].    We comply with applicable federal civil rights laws and [place] laws. We do not discriminate on the basis of race, color, national origin, age, disability, sex, sexual orientation, or gender identity.            Thank you!     Thank you for choosing [facility] [place]  for your care. Our goal is always to provide you with excellent care. Hearing back from our patients is one way we can continue to improve our services. Please take a few minutes to complete the written survey that you may receive in the mail after your visit with us. Thank you!             Your Updated Medication List - Protect others around you: Learn how to safely use, store and throw away your medicines at www.disposemymeds.org.          This list is accurate as of 9/25/18  2:39 PM.  Always use your most recent med list.                   Brand Name Dispense Instructions for use Diagnosis    levothyroxine 112 MCG tablet    SYNTHROID/LEVOTHROID    90 tablet    Take 1 tablet (112 mcg) by mouth daily    Acquired hypothyroidism

## 2018-09-25 NOTE — PATIENT INSTRUCTIONS
Leave the pressure dressing in place for 4-6 hours.  If you feel the dressing is too tight loosen it or remove it completely.  Leave the Band-Aid in place for 24 hours.  Change it if wet.   Leave the steri strip in place until it falls off by itself.  Call the clinic with fever, chills or bleeding from the site.   Use a back up method of birth control such as condoms or abstain from intercourse for 7 days.   Call the clinic for bleeding that is heavier than a normal period for you or if you experience severe pelvic pain.

## 2018-10-16 NOTE — PROGRESS NOTES
"  SUBJECTIVE:   Jerrica Pappas is a 48 year old female who presents to clinic today for the following health issues:    HPI  Joint Pain    Onset: about a month ago    Description:   Location: left knee  Character: Sharp    Intensity: 4/10 right now, took ibuprofen; 9/10 at its worst.     Progression of Symptoms: worse like it was before. Was seen in , did xrays and put her on prednisone. Prednisone helped until she was finished with it. Now the pain is the same as it was before she was on the medication.     Accompanying Signs & Symptoms:  Other symptoms: swelling but has come down.     History:   Previous similar pain: no       Precipitating factors:   Trauma or overuse: YES- being on her feet all day \"triggered it\"    Alleviating factors:  Improved by: ice, support wrap and Ibuprofen    Therapies Tried and outcome: See above.     - Prednisone helped for a couple of months.   - Was seen at [facility] on 07/01/2018 and had X-ray done  - X-ray results 07/01/2018:  FINDINGS:  No fracture or dislocation is identified.  No significant joint space narrowing is seen. There are small marginal osteophytes seen in all 3 compartments of the knee. Patella is normally located. No definite joint effusion identified.    IMPRESSION  IMPRESSION:  No fracture or dislocation seen. Mild osteoarthritis.  - Ibuprofen lasts 2-3 hours, takes 600 mg at a time.   - No recent or past history of injuries  - Being on her feet all day at works makes it worse  - Any pressure on the knee hurts, walking, going up and down stairs, even flexing the knee hurts.   - Denies catching, locking, laxity of the knee.     Problem list and histories reviewed & adjusted, as indicated.  Additional history: as documented    Patient Active Problem List   Diagnosis     Acquired hypothyroidism     Influenza vaccination declined     Nexplanon in place     Pes planus of both feet     Plantar fasciitis     BMI >40 (H)     Plantar fasciitis of right foot     " Family history of diabetes mellitus     Past Surgical History:   Procedure Laterality Date     LAPAROTOMY MINI, TUBAL LIGATION, COMBINED       OTHER SURGICAL HISTORY      cyst on ears        Social History   Substance Use Topics     Smoking status: Never Smoker     Smokeless tobacco: Never Used     Alcohol use No     Family History   Problem Relation Age of Onset     Hypertension Father      Psychotic Disorder Brother      ?schizophrenia, better on meds         Current Outpatient Prescriptions   Medication Sig Dispense Refill     levothyroxine (SYNTHROID/LEVOTHROID) 112 MCG tablet Take 1 tablet (112 mcg) by mouth daily 90 tablet 3     predniSONE (DELTASONE) 20 MG tablet Take 2 tablets (40 mg) by mouth daily for 5 days 10 tablet 0       ROS:  Constitutional, HEENT, cardiovascular, pulmonary, GI, , musculoskeletal, neuro, skin systems are negative, except as otherwise noted.    OBJECTIVE:     /86  Pulse 80  Temp 97.3  F (36.3  C) (Temporal)  Resp 24  Wt 273 lb 12.8 oz (124.2 kg)  BMI 43.13 kg/m2  Body mass index is 43.13 kg/(m^2).  GENERAL: healthy, alert and no distress  MS: normal muscle tone and gait antalgic, decreased passive ROM of the left knee with flexion, normal extension, normal valgus and varus stress, negative anterior and posterior drawer test, unable to perform Joan due to discomfort.  Moderate effusion of knee.   SKIN: no suspicious lesions or rashes  NEURO: Normal strength and tone, mentation intact and speech normal  PSYCH: mentation appears normal, affect normal/bright    Diagnostic Test Results:  none     ASSESSMENT/PLAN:       ICD-10-CM    1. Chronic pain of left knee M25.562 predniSONE (DELTASONE) 20 MG tablet    G89.29 ORTHO  REFERRAL       Discussed the findings on the X-ray from [facility].  No acute injury to suggest need for MRI immediately.  She did get very good results with oral prednisone last visit, perhaps would benefit more from local steroid injection  but until she can see Sports Med will restart oral prednisone.  Reminded of potential side effects, avoid ibuprofen while on the prednisone, ok to use Tylenol.  Recommended she use the knee sleeve she has at home for now, ice and elevate when able.  No work today but hopefully can return Friday once she has started the Prednisone.     Follow-up with Sports medicine in 1 week, sooner if needed.     Options for treatment and follow-up care were reviewed with the patient and/or guardian. Patient and/or guardian engaged in the decision making process and verbalized understanding of the options discussed and agreed with the final plan.     Deep Miller PA-C  [facility] prednisone on

## 2018-10-17 ENCOUNTER — OFFICE VISIT (OUTPATIENT)
Dept: FAMILY MEDICINE | Facility: OTHER | Age: 48
End: 2018-10-17
Payer: COMMERCIAL

## 2018-10-17 VITALS
HEART RATE: 80 BPM | SYSTOLIC BLOOD PRESSURE: 122 MMHG | WEIGHT: 273.8 LBS | TEMPERATURE: 97.3 F | BODY MASS INDEX: 43.13 KG/M2 | RESPIRATION RATE: 24 BRPM | DIASTOLIC BLOOD PRESSURE: 86 MMHG

## 2018-10-17 DIAGNOSIS — G89.29 CHRONIC PAIN OF LEFT KNEE: Primary | ICD-10-CM

## 2018-10-17 DIAGNOSIS — M25.562 CHRONIC PAIN OF LEFT KNEE: Primary | ICD-10-CM

## 2018-10-17 PROCEDURE — 99213 OFFICE O/P EST LOW 20 MIN: CPT | Performed by: PHYSICIAN ASSISTANT

## 2018-10-17 RX ORDER — PREDNISONE 20 MG/1
40 TABLET ORAL DAILY
Qty: 10 TABLET | Refills: 0 | Status: SHIPPED | OUTPATIENT
Start: 2018-10-17 | End: 2019-04-12

## 2018-10-17 ASSESSMENT — PAIN SCALES - GENERAL: PAINLEVEL: MODERATE PAIN (4)

## 2018-10-17 NOTE — MR AVS SNAPSHOT
After Visit Summary   10/17/2018    Jerrica Pappas    MRN: [id number]           Patient Information     Date Of Birth          1970        Visit Information        Provider Department      10/17/2018 8:00 AM Deep Miller PA-C [facility]        Today's Diagnoses     Chronic pain of left knee    -  1      Care Instructions    Take prednisone once daily in the morning with food in stomach  While on steroid avoid use of Ibuprofen but OK to take Tylenol every 4-6 hours  Set up with DR. Thomas  Wear the brace for comfort  Ice and elevate after long day at work .             Follow-ups after your visit        Additional Services     ORTHO  REFERRAL       [facility] is referring you to the Orthopedic  Services at [facility].       The  Representative will assist you in the coordination of your Orthopedic and Musculoskeletal Care as prescribed by your physician.    The  Representative will call you within 1 business day to help schedule your appointment, or you may contact the  Representative at:    All areas ~ [phone]     Type of Referral : Non Surgical / Sport Medicine       Timeframe requested: 3 - 5 days    Coverage of these services is subject to the terms and limitations of your health insurance plan.  Please call member services at your health plan with any benefit or coverage questions.      If X-rays, CT or MRI's have been performed, please contact the facility where they were done to arrange for , prior to your scheduled appointment.  Please bring this referral request to your appointment and present it to your specialist.                  Follow-up notes from your care team     Return in about 1 week (around 10/24/2018) for Dr Thomas.      Your next 10 appointments already scheduled     Oct 25, 2018  9:20 AM CDT   New Visit with Ml Thomas MD   [facility]  [place] [facility]    [address]  [place] [id number] [phone]              Who to contact     If you have questions or need follow up information about today's clinic visit or your schedule please contact [facility] directly at [phone].  Normal or non-critical lab and imaging results will be communicated to you by MyChart, letter or phone within 4 business days after the clinic has received the results. If you do not hear from us within 7 days, please contact the clinic through MyChart or phone. If you have a critical or abnormal lab result, we will notify you by phone as soon as possible.  Submit refill requests through [employer] or call your pharmacy and they will forward the refill request to us. Please allow 3 business days for your refill to be completed.          Additional Information About Your Visit        Care EveryWhere ID     This is your Care EveryWhere ID. This could be used by other organizations to access your [facility] medical records  [id number]        Your Vitals Were     Pulse Temperature Respirations BMI (Body Mass Index)          80 97.3  F (36.3  C) (Temporal) 24 43.13 kg/m2         Blood Pressure from Last 3 Encounters:   10/17/18 122/86   09/25/18 120/76   01/25/18 116/82    Weight from Last 3 Encounters:   10/17/18 273 lb 12.8 oz (124.2 kg)   09/25/18 273 lb 12 oz (124.2 kg)   01/25/18 274 lb (124.3 kg)              We Performed the Following     ORTHO  REFERRAL          Today's Medication Changes          These changes are accurate as of 10/17/18  8:31 AM.  If you have any questions, ask your nurse or doctor.               Start taking these medicines.        Dose/Directions    predniSONE 20 MG tablet   Commonly known as:  DELTASONE   Used for:  Chronic pain of left knee   Started by:  Deep Miller PA-C        Dose:  40 mg   Take 2 tablets (40 mg) by mouth daily for 5 days   Quantity:  10 tablet   Refills:  0             Where to get your medicines      These medications were sent to [facility] - [place], [place] - [address] [address] [facility] [address] [facility] [address]  [address]  [address] [address], [facility] [address]     Phone:  [phone]     predniSONE 20 MG tablet                Primary Care Provider Office Phone # Fax #    Renetta MYRICK LEXIE Wheeler [phone]       [address] [place] [address]   [facility] [address]        Equal Access to Services     SINGH MEHTA : Hadii aad ku hadasho Soomaali, waaxda luqadaha, qaybta kaalmada adeegyada, waxay idiin hayaan chasidy gómez . So [facility] [phone].    ATENCIÓN: Si habla español, tiene a blair disposición servicios gratuitos de asistencia lingüística. [facility] [phone].    We comply with applicable federal civil rights laws and [place] laws. We do not discriminate on the basis of race, color, national origin, age, disability, sex, sexual orientation, or gender identity.            Thank you!     Thank you for choosing [facility]  for your care. Our goal is always to provide you with excellent care. Hearing back from our patients is one way we can continue to improve our services. Please take a few minutes to complete the written survey that you may receive in the mail after your visit with us. Thank you!             Your Updated Medication List - Protect others around you: Learn how to safely use, store and throw away your medicines at www.disposemymeds.org.          This list is accurate as of 10/17/18  8:31 AM.  Always use your most recent med list.                   Brand Name Dispense Instructions for use Diagnosis    levothyroxine 112 MCG tablet    SYNTHROID/LEVOTHROID    90 tablet    Take 1 tablet (112 mcg) by mouth daily    Acquired hypothyroidism       predniSONE 20 MG tablet    DELTASONE    10 tablet    Take 2 tablets (40 mg) by mouth daily for 5 days    Chronic pain of left knee

## 2018-10-17 NOTE — PATIENT INSTRUCTIONS
Take prednisone once daily in the morning with food in stomach  While on steroid avoid use of Ibuprofen but OK to take Tylenol every 4-6 hours  Set up with DR. Thomas  Wear the brace for comfort  Ice and elevate after long day at work .

## 2018-11-01 ENCOUNTER — OFFICE VISIT (OUTPATIENT)
Dept: ORTHOPEDICS | Facility: OTHER | Age: 48
End: 2018-11-01
Payer: COMMERCIAL

## 2018-11-01 VITALS
WEIGHT: 274 LBS | DIASTOLIC BLOOD PRESSURE: 72 MMHG | HEIGHT: 67 IN | SYSTOLIC BLOOD PRESSURE: 116 MMHG | BODY MASS INDEX: 43 KG/M2

## 2018-11-01 DIAGNOSIS — M25.562 CHRONIC PAIN OF LEFT KNEE: Primary | ICD-10-CM

## 2018-11-01 DIAGNOSIS — G89.29 CHRONIC PAIN OF LEFT KNEE: Primary | ICD-10-CM

## 2018-11-01 PROCEDURE — 99243 OFF/OP CNSLTJ NEW/EST LOW 30: CPT | Performed by: PHYSICAL MEDICINE & REHABILITATION

## 2018-11-01 NOTE — PATIENT INSTRUCTIONS
Today's Plan of Care:  -Rehab: Physical Therapy: [facility] - [phone]. Please do 5-6 days of exercises per week between formal sessions and the home exercises they provide.  -Patient's preferred over the counter medication as directed on packaging as needed for pain or soreness.  -Ice for 15-20 minutes as needed for soreness  -Brace as needed for comfort and support    -We also discussed other future treatment options:  Steroid injection    Follow Up: As needed if symptoms fail to improve or worsen. Please call with any questions or concerns.

## 2018-11-01 NOTE — LETTER
11/1/2018         RE: Jerrica Pappas  [address]  [place] [address]        Dear Colleague,    Thank you for referring your patient, Jerrica Pappas, to the [facility]. Please see a copy of my visit note below.    Sports Medicine Clinic Visit    PCP: Renetta Wheeler    CC: Patient presents with:  Left Knee - Pain      HPI:  Jerrica Pappas is a 48 year old female who is seen in consultation at the request of Deep Miller PA-C.   She notes left knee pain that began a few months ago with insidious onset. She denies an injury. She notes that the knee pain comes and goes. She has had two courses of prednisone and that has helped while she has been on it. She rates the pain at a  10/10 at its worst and a 3/10 currently.  Symptoms are relieved with prednisone, bracing, ibuprofen, and Tylenol. Symptoms are worsened by walking and sit to stand transition. She endorses swelling (initially) and pain.   She denies bruising, popping, grinding, catching, locking, instability, numbness, tingling and weakness.  Other treatment has included nothing.       Review of Systems:  Musculoskeletal: as above  Remainder of review of systems is negative including constitutional, eyes, ENT, CV, pulmonary, GI, , endocrine, skin, hematologic, and neurologic except as noted in HPI or medical history.    History reviewed. No pertinent past surgical/medical/family/social history other than as mentioned in HPI.    Patient Active Problem List   Diagnosis     Acquired hypothyroidism     Influenza vaccination declined     Nexplanon in place     Pes planus of both feet     Plantar fasciitis     BMI >40 (H)     Plantar fasciitis of right foot     Family history of diabetes mellitus     Past Medical History:   Diagnosis Date     Thyroid disorder      Past Surgical History:   Procedure Laterality Date     LAPAROTOMY MINI, TUBAL LIGATION, COMBINED       OTHER SURGICAL HISTORY      cyst on ears   "    Family History   Problem Relation Age of Onset     Hypertension Father      Psychotic Disorder Brother      ?schizophrenia, better on meds     Social History     Social History     Marital status:      Spouse name: N/A     Number of children: N/A     Years of education: N/A     Occupational History     Not on file.     Social History Main Topics     Smoking status: Never Smoker     Smokeless tobacco: Never Used     Alcohol use No     Drug use: No     Sexual activity: Yes     Partners: Male     Birth control/ protection: Surgical, Implant      Comment: tubal     Other Topics Concern     Not on file     Social History Narrative       She works as a nurse    Current Outpatient Prescriptions   Medication     levothyroxine (SYNTHROID/LEVOTHROID) 112 MCG tablet     No current facility-administered medications for this visit.      No Known Allergies      Objective:  /72  Ht 5' 6.81\" (1.697 m)  Wt 274 lb (124.3 kg)  BMI 43.16 kg/m2    General: Alert and in no distress    Head: Normocephalic, atraumatic  Eyes: no scleral icterus or conjunctival erythema   Oropharynx:  Mucous membranes moist  Skin: no erythema, petechiae, or jaundice  CV: regular rhythm by palpation, 2+ distal pulses  Resp: normal respiratory effort without conversational dyspnea   Psych: normal mood and affect    Gait: Non-antalgic, appropriate coordination and balance   Neuro: Motor strength and sensation as noted below    Musculoskeletal:    Bilateral Knee exam    Inspection:  No erythema, ecchymosis, warmth, or edema    Palpation: No tenderness to palpation of the bilateral knees    Knee ROM: Full active seated knee flexion and extension    Strength:  5/5 Hip flexion/abduction/adduction, knee extension/flexion, ankle plantarflexion/dorsiflexion, great toe extension, toe flexion    Sensation:  Intact to light touch in the bilateral lower extremities      Radiology:  -Having images pushed through PACS  EXAM: Three views of the  left " Knee    DATE: 7/1/2018 11:21 AM    CLINICAL INFORMATION: Pain in left knee.    COMPARISON:  None.    VIEWS: Frontal, lateral, and sunrise views of the left knee were obtained.    FINDINGS:  No fracture or dislocation is identified.  No significant joint space narrowing is seen. There are small marginal osteophytes seen in all 3 compartments of the knee. Patella is normally located. No definite joint effusion identified.    IMPRESSION  IMPRESSION:  No fracture or dislocation seen. Mild osteoarthritis.    REPORT SIGNED BY DR. Carlos Manuel Melgoza    Assessment:  1. Chronic pain of left knee        Plan:  Discussed the assessment with the patient and developed a plan together:  -Rehab: Physical Therapy: [facility] - [phone]. Please do 5-6 days of exercises per week between formal sessions and the home exercises they provide.  -Patient's preferred over the counter medication as directed on packaging as needed for pain or soreness.  -Ice for 15-20 minutes as needed for soreness  -Brace as needed for comfort and support  -We also discussed steroid injection but she feels like she is doing better now and would like to hold off at this time.  -We are having images pushed through the PACS system.      Follow Up: As needed if symptoms fail to improve or worsen. Please call with any questions or concerns.     Josephine Thomas MD, [facility]  [facility]      Again, thank you for allowing me to participate in the care of your patient.        Sincerely,        Ml Thomas MD

## 2018-11-01 NOTE — MR AVS SNAPSHOT
After Visit Summary   11/1/2018    Jerrica Pappas    MRN: [id number]           Patient Information     Date Of Birth          1970        Visit Information        Provider Department      11/1/2018 9:00 AM Ml Thomas MD [facility]        Today's Diagnoses     Chronic pain of left knee    -  1      Care Instructions    Today's Plan of Care:  -Rehab: Physical Therapy: [facility] - [phone]. Please do 5-6 days of exercises per week between formal sessions and the home exercises they provide.  -Patient's preferred over the counter medication as directed on packaging as needed for pain or soreness.  -Ice or heat 15-20 minutes as needed (Avoid sleeping on a heating pad or ice)  -Brace as needed for comfort and support    -We also discussed other future treatment options:  Steroid injection    Follow Up: As needed if symptoms fail to improve or worsen. Please call with any questions or concerns.               Follow-ups after your visit        Additional Services     [facility] PT, HAND, AND CHIROPRACTIC REFERRAL       Physical Therapy, Hand Therapy and Chiropractic Care are available through:  *[facility]  *Hand Therapy (Occupational Therapy or Physical Therapy)  *[facility]    Call one number to schedule at any of the above locations: [phone].    Physical therapy, Hand therapy and/or Chiropractic care has been recommended by your physician as an excellent treatment option to reduce pain and help people return to normal activities, including sports.  Therapy and/or chiropractic care services are a great complement or alternative to expensive and invasive surgery, injections, or long-term use of prescription medications. The primary goal is to identify the underlying problem and provide you the tools to manage your condition on your own.     Please be aware that coverage of these services is subject to  "the terms and limitations of your health insurance plan.  Call member services at your health plan with any benefit or coverage questions.      Please bring the following to your appointment:  *Your personal calendar for scheduling future appointments  *Comfortable clothing                  Future tests that were ordered for you today     Open Future Orders        Priority Expected Expires Ordered    [facility] PT, HAND, AND CHIROPRACTIC REFERRAL Routine  11/1/2019 11/1/2018            Who to contact     If you have questions or need follow up information about today's clinic visit or your schedule please contact [facility] directly at [phone].  Normal or non-critical lab and imaging results will be communicated to you by MyChart, letter or phone within 4 business days after the clinic has received the results. If you do not hear from us within 7 days, please contact the clinic through MyChart or phone. If you have a critical or abnormal lab result, we will notify you by phone as soon as possible.  Submit refill requests through Cycle or call your pharmacy and they will forward the refill request to us. Please allow 3 business days for your refill to be completed.          Additional Information About Your Visit        Care EveryWhere ID     This is your Care EveryWhere ID. This could be used by other organizations to access your [facility] medical records  [id number]        Your Vitals Were     Height BMI (Body Mass Index)                5' 6.81\" (1.697 m) 43.16 kg/m2           Blood Pressure from Last 3 Encounters:   11/01/18 116/72   10/17/18 122/86   09/25/18 120/76    Weight from Last 3 Encounters:   11/01/18 274 lb (124.3 kg)   10/17/18 273 lb 12.8 oz (124.2 kg)   09/25/18 273 lb 12 oz (124.2 kg)               Primary Care Provider Office Phone # Fax #    Renetta Wheeler PA-C [phone]       [address]  [facility] [address]        Equal Access to " Services     [facility]: Hadii tae Davis, wapjda luqadaha, qaybta kajcguzman arredondo. So [facility] [phone].    ATENCIÓN: Si habla español, tiene a blair disposición servicios gratuitos de asistencia lingüística. Llame al [phone].    We comply with applicable federal civil rights laws and [place] laws. We do not discriminate on the basis of race, color, national origin, age, disability, sex, sexual orientation, or gender identity.            Thank you!     Thank you for choosing [facility]  for your care. Our goal is always to provide you with excellent care. Hearing back from our patients is one way we can continue to improve our services. Please take a few minutes to complete the written survey that you may receive in the mail after your visit with us. Thank you!             Your Updated Medication List - Protect others around you: Learn how to safely use, store and throw away your medicines at www.disposemymeds.org.          This list is accurate as of 11/1/18  9:45 AM.  Always use your most recent med list.                   Brand Name Dispense Instructions for use Diagnosis    levothyroxine 112 MCG tablet    SYNTHROID/LEVOTHROID    90 tablet    Take 1 tablet (112 mcg) by mouth daily    Acquired hypothyroidism

## 2018-11-01 NOTE — PROGRESS NOTES
Sports Medicine Clinic Visit    PCP: Renetta Wheeler    CC: Patient presents with:  Left Knee - Pain      HPI:  Jerrica Pappas is a 48 year old female who is seen in consultation at the request of Deep Miller PA-C.   She notes left knee pain that began a few months ago with insidious onset. She denies an injury. She notes that the knee pain comes and goes. She has had two courses of prednisone and that has helped while she has been on it. She rates the pain at a  10/10 at its worst and a 3/10 currently.  Symptoms are relieved with prednisone, bracing, ibuprofen, and Tylenol. Symptoms are worsened by walking and sit to stand transition. She endorses swelling (initially) and pain.   She denies bruising, popping, grinding, catching, locking, instability, numbness, tingling and weakness.  Other treatment has included nothing.       Review of Systems:  Musculoskeletal: as above  Remainder of review of systems is negative including constitutional, eyes, ENT, CV, pulmonary, GI, , endocrine, skin, hematologic, and neurologic except as noted in HPI or medical history.    History reviewed. No pertinent past surgical/medical/family/social history other than as mentioned in HPI.    Patient Active Problem List   Diagnosis     Acquired hypothyroidism     Influenza vaccination declined     Nexplanon in place     Pes planus of both feet     Plantar fasciitis     BMI >40 (H)     Plantar fasciitis of right foot     Family history of diabetes mellitus     Past Medical History:   Diagnosis Date     Thyroid disorder      Past Surgical History:   Procedure Laterality Date     LAPAROTOMY MINI, TUBAL LIGATION, COMBINED       OTHER SURGICAL HISTORY      cyst on ears      Family History   Problem Relation Age of Onset     Hypertension Father      Psychotic Disorder Brother      ?schizophrenia, better on meds     Social History     Social History     Marital status:      Spouse name: N/A     Number of children: N/A  "    Years of education: N/A     Occupational History     Not on file.     Social History Main Topics     Smoking status: Never Smoker     Smokeless tobacco: Never Used     Alcohol use No     Drug use: No     Sexual activity: Yes     Partners: Male     Birth control/ protection: Surgical, Implant      Comment: tubal     Other Topics Concern     Not on file     Social History Narrative       She works as a nurse    Current Outpatient Prescriptions   Medication     levothyroxine (SYNTHROID/LEVOTHROID) 112 MCG tablet     No current facility-administered medications for this visit.      No Known Allergies      Objective:  /72  Ht 5' 6.81\" (1.697 m)  Wt 274 lb (124.3 kg)  BMI 43.16 kg/m2    General: Alert and in no distress    Head: Normocephalic, atraumatic  Eyes: no scleral icterus or conjunctival erythema   Oropharynx:  Mucous membranes moist  Skin: no erythema, petechiae, or jaundice  CV: regular rhythm by palpation, 2+ distal pulses  Resp: normal respiratory effort without conversational dyspnea   Psych: normal mood and affect    Gait: Non-antalgic, appropriate coordination and balance   Neuro: Motor strength and sensation as noted below    Musculoskeletal:    Bilateral Knee exam    Inspection:  No erythema, ecchymosis, warmth, or edema    Palpation: No tenderness to palpation of the bilateral knees    Knee ROM: Full active seated knee flexion and extension    Strength:  5/5 Hip flexion/abduction/adduction, knee extension/flexion, ankle plantarflexion/dorsiflexion, great toe extension, toe flexion    Sensation:  Intact to light touch in the bilateral lower extremities      Radiology:  -Having images pushed through PACS  EXAM: Three views of the  left Knee    DATE: 7/1/2018 11:21 AM    CLINICAL INFORMATION: Pain in left knee.    COMPARISON:  None.    VIEWS: Frontal, lateral, and sunrise views of the left knee were obtained.    FINDINGS:  No fracture or dislocation is identified.  No significant joint space " narrowing is seen. There are small marginal osteophytes seen in all 3 compartments of the knee. Patella is normally located. No definite joint effusion identified.    IMPRESSION  IMPRESSION:  No fracture or dislocation seen. Mild osteoarthritis.    REPORT SIGNED BY DR. Carlos Manuel Melgoza    Assessment:  1. Chronic pain of left knee        Plan:  Discussed the assessment with the patient and developed a plan together:  -Rehab: Physical Therapy: [facility] - [phone]. Please do 5-6 days of exercises per week between formal sessions and the home exercises they provide.  -Patient's preferred over the counter medication as directed on packaging as needed for pain or soreness.  -Ice for 15-20 minutes as needed for soreness  -Brace as needed for comfort and support  -We also discussed steroid injection but she feels like she is doing better now and would like to hold off at this time.  -We are having images pushed through the PACS system.      Follow Up: As needed if symptoms fail to improve or worsen. Please call with any questions or concerns.     Josephine Thomas MD, CAQ Sports Medicine  [facility]

## 2019-03-08 DIAGNOSIS — E03.9 ACQUIRED HYPOTHYROIDISM: ICD-10-CM

## 2019-03-08 RX ORDER — LEVOTHYROXINE SODIUM 112 UG/1
112 TABLET ORAL DAILY
Qty: 30 TABLET | Refills: 0 | Status: SHIPPED | OUTPATIENT
Start: 2019-03-08 | End: 2019-04-12

## 2019-03-08 NOTE — TELEPHONE ENCOUNTER
"Requested Prescriptions   Pending Prescriptions Disp Refills     levothyroxine (SYNTHROID/LEVOTHROID) 112 MCG tablet 90 tablet 3     Sig: Take 1 tablet (112 mcg) by mouth daily    Thyroid Protocol Failed - 3/8/2019 11:17 AM       Failed - Normal TSH on file in past 12 months    Recent Labs   Lab Test 01/25/18  0917   TSH 3.81          Passed - Patient is 12 years or older       Passed - Recent (12 mo) or future (30 days) visit within the authorizing provider's specialty    Patient had office visit in the last 12 months or has a visit in the next 30 days with authorizing provider or within the authorizing provider's specialty.  See \"Patient Info\" tab in inbasket, or \"Choose Columns\" in Meds & Orders section of the refill encounter.           Passed - Medication is active on med list       Passed - No active pregnancy on record    If patient is pregnant or has had a positive pregnancy test, please check TSH.       Passed - No positive pregnancy test in past 12 months    If patient is pregnant or has had a positive pregnancy test, please check TSH.        levothyroxine (SYNTHROID/LEVOTHROID) 112 MCG tablet  Routing refill request to provider for review/approval because:  Labs not current:  TSH  Patient needs to be seen because:  Due for physical and labs  Patient needs to be seen because it has been more than 1 year since last office visit.    Ana Hidalgo, RN, BSN     "

## 2019-04-08 NOTE — PROGRESS NOTES
"  SUBJECTIVE:   Jerrica Pappas is a 48 year old female who presents to clinic today for the following health issues:      History of Present Illness     Hypothyroidism:     Since last visit, patient describes the following symptoms::  None    Diet:  Regular (no restrictions)  Frequency of exercise:  2-3 days/week  Duration of exercise:  15-30 minutes  Taking medications regularly:  Yes  Medication side effects:  None  Additional concerns today:  No      Problem list and histories reviewed & adjusted, as indicated.  Additional history: as documented    Patient Active Problem List   Diagnosis     Acquired hypothyroidism     Influenza vaccination declined     Nexplanon in place     Pes planus of both feet     Plantar fasciitis     BMI >40 (H)     Plantar fasciitis of right foot     Family history of diabetes mellitus     Past Surgical History:   Procedure Laterality Date     LAPAROTOMY MINI, TUBAL LIGATION, COMBINED       OTHER SURGICAL HISTORY      cyst on ears        Social History     Tobacco Use     Smoking status: Never Smoker     Smokeless tobacco: Never Used   Substance Use Topics     Alcohol use: No     Family History   Problem Relation Age of Onset     Hypertension Father      Psychotic Disorder Brother         ?schizophrenia, better on meds         Current Outpatient Medications   Medication Sig Dispense Refill     levothyroxine (SYNTHROID/LEVOTHROID) 112 MCG tablet Take 1 tablet (112 mcg) by mouth daily 30 tablet 0       ROS:  Constitutional, HEENT, cardiovascular, pulmonary, GI, , musculoskeletal, neuro, skin, endocrine systems are negative, except as otherwise noted.    OBJECTIVE:     /70 (BP Location: Right arm, Patient Position: Chair, Cuff Size: Adult Large)   Pulse 80   Temp 98.1  F (36.7  C) (Temporal)   Resp 16   Ht 1.697 m (5' 6.81\")   Wt 123.4 kg (272 lb)   SpO2 98%   BMI 42.84 kg/m    Body mass index is 42.84 kg/m .  GENERAL: healthy, alert and no distress  NECK: no adenopathy, no " asymmetry, masses, or scars and thyroid normal to palpation  RESP: lungs clear to auscultation - no rales, rhonchi or wheezes  CV: regular rate and rhythm, normal S1 S2, no S3 or S4, no murmur, click or rub, no peripheral edema and peripheral pulses strong  MS: no gross musculoskeletal defects noted, no edema  SKIN: no suspicious lesions or rashes    Diagnostic Test Results:  No results found for this or any previous visit (from the past 24 hour(s)).    ASSESSMENT/PLAN:       ICD-10-CM    1. Acquired hypothyroidism E03.9 levothyroxine (SYNTHROID/LEVOTHROID) 112 MCG tablet       Patient asymptomatic and has remained on Levothyroxine without skipping or missed doses.   Refilled medication  Lab ordered by [facility] done 04/12/19.  We will call with result and adjust as needed  Discussed her labs from last year which showed normal cholesterol and blood sugar and reminded of healthy eating habits and exercise.  She is taking Glucosamine and fish oil and that seems to be helping her knees.     Follow-up in 1 year recommended physical at that time, sooner pending labs.     Options for treatment and follow-up care were reviewed with the patient and/or guardian. Patient and/or guardian engaged in the decision making process and verbalized understanding of the options discussed and agreed with the final plan.      Deep Miller PA-C  [facility]

## 2019-04-12 ENCOUNTER — OFFICE VISIT (OUTPATIENT)
Dept: FAMILY MEDICINE | Facility: OTHER | Age: 49
End: 2019-04-12
Payer: COMMERCIAL

## 2019-04-12 VITALS
WEIGHT: 272 LBS | DIASTOLIC BLOOD PRESSURE: 70 MMHG | RESPIRATION RATE: 16 BRPM | HEIGHT: 67 IN | OXYGEN SATURATION: 98 % | TEMPERATURE: 98.1 F | BODY MASS INDEX: 42.69 KG/M2 | HEART RATE: 80 BPM | SYSTOLIC BLOOD PRESSURE: 118 MMHG

## 2019-04-12 DIAGNOSIS — E03.9 ACQUIRED HYPOTHYROIDISM: ICD-10-CM

## 2019-04-12 LAB — TSH SERPL DL<=0.005 MIU/L-ACNC: 1.98 MU/L (ref 0.4–4)

## 2019-04-12 PROCEDURE — 36415 COLL VENOUS BLD VENIPUNCTURE: CPT | Performed by: PHYSICIAN ASSISTANT

## 2019-04-12 PROCEDURE — 99213 OFFICE O/P EST LOW 20 MIN: CPT | Performed by: PHYSICIAN ASSISTANT

## 2019-04-12 PROCEDURE — 84443 ASSAY THYROID STIM HORMONE: CPT | Performed by: PHYSICIAN ASSISTANT

## 2019-04-12 RX ORDER — LEVOTHYROXINE SODIUM 112 UG/1
112 TABLET ORAL DAILY
Qty: 90 TABLET | Refills: 3 | Status: SHIPPED | OUTPATIENT
Start: 2019-04-12 | End: 2020-04-22

## 2019-04-12 ASSESSMENT — MIFFLIN-ST. JEOR: SCORE: 1898.39

## 2019-04-12 ASSESSMENT — PAIN SCALES - GENERAL: PAINLEVEL: NO PAIN (0)

## 2019-04-15 NOTE — RESULT ENCOUNTER NOTE
Please call patient with the following message    Mail stable/normal labs.    Cedrick Wheeler PA-C

## 2019-09-30 NOTE — PROGRESS NOTES
Subjective     Jerrica Pappas is a 49 year old female who presents to clinic today for the following health issues:    HPI   URINARY TRACT SYMPTOMS  Onset: the last couple of weeks.     Description:   Painful urination (Dysuria): no            Frequency: YES- especially at night, she goes about every two hours.   Blood in urine (Hematuria): no   Delay in urine (Hesitency): no     Intensity: 6-7/10    Progression of Symptoms:  same    Accompanying Signs & Symptoms:  Fever/chills: no   Flank pain YES- and radiates to abdomen. She also feels it when she gets up after sitting for a long time.   Nausea and vomiting: no   Any vaginal symptoms: none  Abdominal/Pelvic Pain: YES  Headaches: she wakes up in the mornings with headaches.     History:   History of frequent UTI's: no   History of kidney stones: no   Sexually Active: YES- but not a very long time.   Possibility of pregnancy: No-she had a tubal.     Precipitating factors:   Sitting for long periods of time    Therapies Tried and outcome: tylenol, cranberry    This has been going on for the last few weeks to a month.   Notices that at night she is waking up every 2 hours to urinate.    During the day not noticing an increase in frequency.   She tried to stop drinking fluids after 6 and it didn't help.   She is urinating a significant amount each time. There is no associated pain or bleeding.    She does notice she'll get a lot of lower abdominal pressure, sometimes will have pressure vaginally and it feels like right after she had given birth.   She had 4 children vaginally without complications.    No problems with constipation.   Occasionally has some stress incontinence.       Current Outpatient Medications   Medication Sig Dispense Refill     levothyroxine (SYNTHROID/LEVOTHROID) 112 MCG tablet Take 1 tablet (112 mcg) by mouth daily 90 tablet 3     azithromycin (ZITHROMAX) 500 MG tablet Take 1 tab daily for 3 days as needed for severe travelers diarrhea.        "mefloquine (LARIAM) 250 MG tablet Take 1 tab weekly starting 2 wks before malarial mosquito exposure, wkly while there, and continue for 4 weeks after       Allergies   Allergen Reactions     Chloroquine        Reviewed and updated as needed this visit by Provider  Tobacco  Allergies  Meds  Problems  Med Hx  Surg Hx  Fam Hx         Review of Systems   ROS COMP: Constitutional, HEENT, cardiovascular, pulmonary, gi and gu systems are negative, except as otherwise noted.      Objective    /80   Pulse 77   Temp 96.7  F (35.9  C) (Temporal)   Resp 16   Ht 1.695 m (5' 6.73\")   Wt 134.5 kg (296 lb 9.6 oz)   LMP  (LMP Unknown)   SpO2 99%   BMI 46.83 kg/m    Body mass index is 46.83 kg/m .  Physical Exam   GENERAL: healthy, alert and no distress  ABDOMEN: soft, nontender, no hepatosplenomegaly, no masses and bowel sounds normal   (female): normal female external genitalia, normal urethral meatus , normal cervix, adnexae, and uterus without masses. and mild prominence with valsalva of the anterior vaginal wall.   MS: no gross musculoskeletal defects noted, no edema  PSYCH: mentation appears normal, affect normal/bright    Diagnostic Test Results:  Labs reviewed in Epic  Results for orders placed or performed in visit on 10/01/19 (from the past 24 hour(s))   *UA reflex to Microscopic and Culture ([place] and [facility] (except [place] and [place])   Result Value Ref Range    Color Urine Yellow     Appearance Urine Clear     Glucose Urine Negative NEG^Negative mg/dL    Bilirubin Urine Negative NEG^Negative    Ketones Urine Negative NEG^Negative mg/dL    Specific Gravity Urine 1.025 1.003 - 1.035    Blood Urine Negative NEG^Negative    pH Urine 5.5 5.0 - 7.0 pH    Protein Albumin Urine Negative NEG^Negative mg/dL    Urobilinogen Urine 0.2 0.2 - 1.0 EU/dL    Nitrite Urine Negative NEG^Negative    Leukocyte Esterase Urine Negative NEG^Negative    Source Unspecified Urine            Assessment & Plan "       ICD-10-CM    1. Pelvic floor dysfunction M62.89 [facility] PT, HAND, AND CHIROPRACTIC REFERRAL   2. Urinary frequency R35.0 [facility] PT, HAND, AND CHIROPRACTIC REFERRAL     UROLOGY ADULT REFERRAL   3. Nocturia R35.1 [facility] PT, HAND, AND CHIROPRACTIC REFERRAL     UROLOGY ADULT REFERRAL       Differential diagnosis includes pelvic floor weakness, uterine prolapse, cystocele, rectocele, stress/urge incontinence, overactive bladder, vaginitis, UTI, kidney stone.     Her UA is negative for signs of infection or blood.  She is not having persistent pain to suggest vaginal or urinary infection or kidney stone.  Higher suspicion for pelvic floor dysfunction with associated cystocele versus rectocele or uterine prolapse.  She has had 4 children vaginally without complications, examination shows increased pressure anteriorly in the vagina with valsalva.  Recommended evaluation with [facility] specialty and if not improving, worse recommended she see Urology for evaluation.      Return in about 2 weeks (around 10/15/2019) for If not improving, sooner if worse or new concerns.    Options for treatment and follow-up care were reviewed with the patient and/or guardian. Patient and/or guardian engaged in the decision making process and verbalized understanding of the options discussed and agreed with the final plan.      Deep Miller PA-C  [facility]

## 2019-10-01 ENCOUNTER — OFFICE VISIT (OUTPATIENT)
Dept: FAMILY MEDICINE | Facility: OTHER | Age: 49
End: 2019-10-01
Payer: COMMERCIAL

## 2019-10-01 VITALS
OXYGEN SATURATION: 99 % | SYSTOLIC BLOOD PRESSURE: 114 MMHG | RESPIRATION RATE: 16 BRPM | WEIGHT: 293 LBS | BODY MASS INDEX: 45.99 KG/M2 | HEIGHT: 67 IN | HEART RATE: 77 BPM | TEMPERATURE: 96.7 F | DIASTOLIC BLOOD PRESSURE: 80 MMHG

## 2019-10-01 DIAGNOSIS — M62.89 PELVIC FLOOR DYSFUNCTION: Primary | ICD-10-CM

## 2019-10-01 DIAGNOSIS — R35.0 URINARY FREQUENCY: ICD-10-CM

## 2019-10-01 DIAGNOSIS — R35.1 NOCTURIA: ICD-10-CM

## 2019-10-01 LAB
ALBUMIN UR-MCNC: NEGATIVE MG/DL
APPEARANCE UR: CLEAR
BILIRUB UR QL STRIP: NEGATIVE
COLOR UR AUTO: YELLOW
GLUCOSE UR STRIP-MCNC: NEGATIVE MG/DL
HGB UR QL STRIP: NEGATIVE
KETONES UR STRIP-MCNC: NEGATIVE MG/DL
LEUKOCYTE ESTERASE UR QL STRIP: NEGATIVE
NITRATE UR QL: NEGATIVE
PH UR STRIP: 5.5 PH (ref 5–7)
SOURCE: NORMAL
SP GR UR STRIP: 1.02 (ref 1–1.03)
UROBILINOGEN UR STRIP-ACNC: 0.2 EU/DL (ref 0.2–1)

## 2019-10-01 PROCEDURE — 99213 OFFICE O/P EST LOW 20 MIN: CPT | Performed by: PHYSICIAN ASSISTANT

## 2019-10-01 PROCEDURE — 81003 URINALYSIS AUTO W/O SCOPE: CPT | Performed by: PHYSICIAN ASSISTANT

## 2019-10-01 RX ORDER — AZITHROMYCIN 500 MG/1
TABLET, FILM COATED ORAL
COMMUNITY
Start: 2019-08-27 | End: 2020-05-29

## 2019-10-01 RX ORDER — MEFLOQUINE HYDROCHLORIDE 250 MG/1
TABLET ORAL
COMMUNITY
Start: 2019-08-27 | End: 2020-05-29

## 2019-10-01 ASSESSMENT — MIFFLIN-ST. JEOR: SCORE: 2003.74

## 2019-10-01 NOTE — PATIENT INSTRUCTIONS
Set up with [facility]. They should call to schedule, if you don't hear from them please let us know or call the number on this sheet.     If not improving or getting worse schedule in our clinic with Dr. Aguayo our urologist.

## 2019-10-14 ENCOUNTER — TELEPHONE (OUTPATIENT)
Dept: FAMILY MEDICINE | Facility: OTHER | Age: 49
End: 2019-10-14

## 2019-10-14 NOTE — TELEPHONE ENCOUNTER
You placed a referral for patient to urology on 10/1/19.  Patient has not scheduled as of yet.      Please review and forward to team if follow up with the patient is needed.     Thank you!  Dalia/Clinic Referrals [facility]

## 2020-04-22 DIAGNOSIS — E03.9 ACQUIRED HYPOTHYROIDISM: ICD-10-CM

## 2020-04-22 RX ORDER — LEVOTHYROXINE SODIUM 112 UG/1
TABLET ORAL
Qty: 30 TABLET | Refills: 0 | Status: SHIPPED | OUTPATIENT
Start: 2020-04-22 | End: 2020-06-29

## 2020-04-22 NOTE — TELEPHONE ENCOUNTER
Looks like patient has been pretty stable, however, it has been a year. I will do a veronica refill with the understanding that she will come in to do the labs and virtual visit after labs are done with PCP.       MAUDE Palmer CNP  Questions or concerns please feel free to send me a [employer] message or call me  Phone : [phone]

## 2020-04-22 NOTE — TELEPHONE ENCOUNTER
Pending Prescriptions:                       Disp   Refills    levothyroxine (SYNTHROID/LEVOTHROID) 112 *90 tab*0            Sig: TAKE ONE TABLET BY MOUTH ONE TIME DAILY     Routing refill request to provider for review/approval because:  Labs not current:  TSH recheck due 4/20    Johanna Rodriguez, BSN, RN, PHN

## 2020-04-23 ENCOUNTER — TELEPHONE (OUTPATIENT)
Dept: FAMILY MEDICINE | Facility: OTHER | Age: 50
End: 2020-04-23

## 2020-04-23 NOTE — TELEPHONE ENCOUNTER
Reason for call:  Pt is calling because she works with someone that was just recently tested positive for the covid-19. Pt states that she hasnt been feeling well, having stomach cramps, headaches and feeling tired a lot. Please advise.

## 2020-04-23 NOTE — TELEPHONE ENCOUNTER
Spoke with patient. Last time she saw her coworker was last week Tuesday. Her symptoms started Monday. No fever.  No cough. Stomach cramps and tired with headaches.  OTC tylenol has been helping.     Will have her continue to monitor symptoms and self isolate at home.  Wash hands well and keep distance.  If not improving with home care will need some sort of visit and she will call back.    Barry Hi RN, BSN          COVID 19 Nurse Triage Plan/Patient Instructions    Please be aware that novel coronavirus (COVID-19) may be circulating in the community. If you develop symptoms such as fever, cough, or SOB or if you have concerns about the presence of another infection including coronavirus (COVID-19), please contact your health care provider or visit [email or web address].     Disposition/Instructions    Patient to  follow home care protocol based instructions.     Thank you for limiting contact with others, wearing a simple mask to cover your cough, practice good hand hygiene habits and accessing our virtual services where possible to limit the spread of this virus.    For more information about COVID19 and options for caring for yourself at home, please visit the CDC website at https://www.cdc.gov/coronavirus/2019-ncov/about/steps-when-sick.html  For more options for care at [facility], please visit our website at [email or web address]    For more information, please use the [place] Department of Health COVID-19 Website: https://www.health.[facility].[place]/diseases/coronavirus/index.html  [place] Department of Health ([facility]) COVID-19 Hotlines (Interpreters available):      Health questions: Phone Number: [phone] or [phone] and Hours: 7 a.m. to 7 p.m.    Schools and  questions: Phone Number: [phone] or [phone] and Hours 7 a.m. to 7 p.m.

## 2020-05-29 ENCOUNTER — VIRTUAL VISIT (OUTPATIENT)
Dept: FAMILY MEDICINE | Facility: OTHER | Age: 50
End: 2020-05-29
Payer: COMMERCIAL

## 2020-05-29 ENCOUNTER — ANCILLARY PROCEDURE (OUTPATIENT)
Dept: ULTRASOUND IMAGING | Facility: CLINIC | Age: 50
End: 2020-05-29
Attending: NURSE PRACTITIONER
Payer: COMMERCIAL

## 2020-05-29 ENCOUNTER — TELEPHONE (OUTPATIENT)
Dept: FAMILY MEDICINE | Facility: OTHER | Age: 50
End: 2020-05-29

## 2020-05-29 DIAGNOSIS — M79.662 PAIN OF LEFT CALF: ICD-10-CM

## 2020-05-29 DIAGNOSIS — M79.662 PAIN OF LEFT CALF: Primary | ICD-10-CM

## 2020-05-29 PROCEDURE — 93971 EXTREMITY STUDY: CPT | Mod: LT | Performed by: RADIOLOGY

## 2020-05-29 PROCEDURE — 99213 OFFICE O/P EST LOW 20 MIN: CPT | Mod: 95 | Performed by: NURSE PRACTITIONER

## 2020-05-29 RX ORDER — ASPIRIN 81 MG/1
81 TABLET, CHEWABLE ORAL DAILY
COMMUNITY
End: 2021-10-05

## 2020-05-29 NOTE — PROGRESS NOTES
"Jerrica Pappas is a 50 year old female who is being evaluated via a billable telephone visit.      The patient has been notified of following:     \"This telephone visit will be conducted via a call between you and your physician/provider. We have found that certain health care needs can be provided without the need for a physical exam.  This service lets us provide the care you need with a short phone conversation.  If a prescription is necessary we can send it directly to your pharmacy.  If lab work is needed we can place an order for that and you can then stop by our lab to have the test done at a later time.    Telephone visits are billed at different rates depending on your insurance coverage. During this emergency period, for some insurers they may be billed the same as an in-person visit.  Please reach out to your insurance provider with any questions.    If during the course of the call the physician/provider feels a telephone visit is not appropriate, you will not be charged for this service.\"    Patient has given verbal consent for Telephone visit?  Yes    What phone number would you like to be contacted at? [phone]    How would you like to obtain your AVS? Mail a copy    Subjective     Jerrica Pappas is a 50 year old female who presents via phone visit today for the following health issues:    HPI  Joint Pain  Left Leg      Onset:      Description:   Location:  Left leg and calf  Character: Sharp, Burning and Cramping    Intensity: moderate, severe    Progression of Symptoms: worse    Accompanying Signs & Symptoms:  Other symptoms: numbness and swelling    History:   Previous similar pain: no       Precipitating factors:   Trauma or overuse: no     Alleviating factors:  Improved by: ice, immobilization and acetaminophen    Therapies Tried and outcome: ASA    She is worried it is a blood clot.  She has never had a blood clot.  No family history of blood clots. She does not smoke.  Pain started a few " days ago- took asa and tylenol.  Pain is worse at night.  During day uses ace wrap and wraps her leg.  Was supposed to go to work today but could not go due to pain.  No known injury.  Pain is mostly the back of her left leg.  No recent travel.  Has been walking some for exercise.  No shortness of breath with exercise.  Denies chest pain      Patient Active Problem List   Diagnosis     Acquired hypothyroidism     Influenza vaccination declined     Nexplanon in place     Pes planus of both feet     Plantar fasciitis     BMI >40 (H)     Plantar fasciitis of right foot     Family history of diabetes mellitus     Past Surgical History:   Procedure Laterality Date     LAPAROTOMY MINI, TUBAL LIGATION, COMBINED       OTHER SURGICAL HISTORY      cyst on ears        Social History     Tobacco Use     Smoking status: Never Smoker     Smokeless tobacco: Never Used   Substance Use Topics     Alcohol use: No     Family History   Problem Relation Age of Onset     Hypertension Father      Psychotic Disorder Brother         ?schizophrenia, better on meds         Current Outpatient Medications   Medication Sig Dispense Refill     aspirin (ASA) 81 MG chewable tablet Take 81 mg by mouth daily       levothyroxine (SYNTHROID/LEVOTHROID) 112 MCG tablet TAKE ONE TABLET BY MOUTH ONE TIME DAILY  30 tablet 0     Allergies   Allergen Reactions     Chloroquine        Reviewed and updated as needed this visit by Provider         Review of Systems   Constitutional, HEENT, cardiovascular, pulmonary, gi and gu systems are negative, except as otherwise noted.       Objective   Reported vitals:  There were no vitals taken for this visit.   healthy, alert and no distress  PSYCH: Alert and oriented times 3; coherent speech, normal   rate and volume, able to articulate logical thoughts, able   to abstract reason, no tangential thoughts, no hallucinations   or delusions  Her affect is normal  RESP: No cough, no audible wheezing, able to talk in full  sentences  Remainder of exam unable to be completed due to telephone visits    Diagnostic Test Results:  Labs reviewed in Epic  No results found for this or any previous visit (from the past 24 hour(s)).        Assessment/Plan:  1. Pain of left calf  Modified wells score is two. She is also morbidly obese.  Will obtain venous duplex.  If negative should contine ice, tylenol, gentle stretching.    - US Lower Extremity Venous Duplex Left; Future    Return in about 2 days (around 5/31/2020) for not improving or new concerns.      Phone call duration:  5 minutes    Carole Tabares, NP

## 2020-05-29 NOTE — TELEPHONE ENCOUNTER
LMTC: Please see message below.  Karen Weaver CMA ([facility])    Notes recorded by Carole Tabares NP on 5/29/2020 at 12:47 PM CDT   Please call patient and let her know her ultrasound was negative for blood clot which is very reassuring.  Carole Tabares, CNP

## 2020-06-25 DIAGNOSIS — E03.9 ACQUIRED HYPOTHYROIDISM: ICD-10-CM

## 2020-06-26 NOTE — TELEPHONE ENCOUNTER
Routing refill request to provider for review/approval because:  Labs not current:  TSH    Yaneth Arana, RN, BSN

## 2020-06-29 RX ORDER — LEVOTHYROXINE SODIUM 112 UG/1
112 TABLET ORAL DAILY
Qty: 30 TABLET | Refills: 0 | Status: SHIPPED | OUTPATIENT
Start: 2020-06-29 | End: 2020-08-06

## 2020-06-29 NOTE — TELEPHONE ENCOUNTER
Please assist in scheduling thyroid follow up appointment and labs.     Barbara refill given x2    Cedrick Aldana-LEXIE Bertrand  [facility]

## 2020-08-04 NOTE — PROGRESS NOTES
Subjective     Jerrica Pappas is a 50 year old female who presents to clinic today for the following health issues:    HPI     Fam hx cardiac arrest  Pt reports her 14yr old son had cardiac arrest while playing football outdoors with friends Oct 2019. He was running/playing and collapsed. A friend's parent administered CPR at the scene. He has since had a cardiac surgery. Pt states her son's cardiology team advised she have cardiac evaluation.   Pt denies any CP, CP or palpitations w/exercise, HEARD with exercise, syncope.     Hypothyroidism Follow-up- levothyroxine for 20 years. On 112mcg, dose has been stable for a few years. Last labs 04/12/2019- over 1 yr ago  Has been consistent with doses. No missed.       Since last visit, patient describes the following symptoms: Weight stable, no hair loss, no skin changes, no constipation, no loose stools      How many servings of fruits and vegetables do you eat daily?  2-3    On average, how many sweetened beverages do you drink each day (Examples: soda, juice, sweet tea, etc.  Do NOT count diet or artificially sweetened beverages)?   0    How many days per week do you exercise enough to make your heart beat faster? 3 or less    How many minutes a day do you exercise enough to make your heart beat faster? 30 - 60    How many days per week do you miss taking your medication? 0      History of Covid  She reports she had Covid in early July- ill for 2 weeks. 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 At [place] .  Her 20yr old daughter was ill first and whole family was sick  She has been well for >2 weeks. She would like jermain testing. She is concerned about getting sick again.   She denies any lingering cough, CP, fatigue or residual sx.       Patient Active Problem List   Diagnosis     Acquired hypothyroidism     Influenza vaccination declined     Nexplanon in place     Pes planus of both feet     Plantar fasciitis     BMI >40 (H)      "Plantar fasciitis of right foot     Family history of diabetes mellitus     Past Surgical History:   Procedure Laterality Date     LAPAROTOMY MINI, TUBAL LIGATION, COMBINED       OTHER SURGICAL HISTORY      cyst on ears        Social History     Tobacco Use     Smoking status: Never Smoker     Smokeless tobacco: Never Used   Substance Use Topics     Alcohol use: No     Family History   Problem Relation Age of Onset     Hypertension Father      Psychotic Disorder Brother         ?schizophrenia, better on meds     Arrhythmia Son         sudden cardiac arrest         Current Outpatient Medications   Medication Sig Dispense Refill     aspirin (ASA) 81 MG chewable tablet Take 81 mg by mouth daily       levothyroxine (SYNTHROID/LEVOTHROID) 112 MCG tablet Take 1 tablet (112 mcg) by mouth daily APT REQUIRED FOR FURTHER REFILLS 30 tablet 0     Allergies   Allergen Reactions     Chloroquine      BP Readings from Last 3 Encounters:   08/06/20 118/78   10/01/19 114/80   04/12/19 118/70    Wt Readings from Last 3 Encounters:   08/06/20 126.8 kg (279 lb 8 oz)   10/01/19 134.5 kg (296 lb 9.6 oz)   04/12/19 123.4 kg (272 lb)                  Reviewed and updated as needed this visit by Provider         Review of Systems   Constitutional, HEENT, cardiovascular, pulmonary, gi and gu systems are negative, except as otherwise noted.      Objective    /78 (BP Location: Right arm, Patient Position: Chair, Cuff Size: Adult Regular)   Pulse 81   Temp 98  F (36.7  C) (Temporal)   Resp 18   Ht 1.695 m (5' 6.73\")   Wt 126.8 kg (279 lb 8 oz)   SpO2 100%   BMI 44.13 kg/m    Body mass index is 44.13 kg/m .  Physical Exam   GENERAL: well apearing, alert and no distress  EYES: Eyes grossly normal to inspection, PERRL and conjunctivae and sclerae normal  HENT: ear canals and TM's normal, nose and mouth without ulcers or lesions  NECK: no adenopathy, no asymmetry, masses, or scars and thyroid normal to palpation  RESP: lungs clear to " "auscultation - no rales, rhonchi or wheezes  CV: regular rate and rhythm, normal S1 S2, no S3 or S4, no murmur, click or rub, no peripheral edema and peripheral pulses strong  ABDOMEN: soft, nontender, no hepatosplenomegaly, no masses and bowel sounds normal  MS: no gross musculoskeletal defects noted, no edema  NEURO: Normal strength and tone, mentation intact and speech normal  PSYCH: mentation appears normal, affect normal/bright    Diagnostic Test Results:  Labs reviewed in Epic  Results for orders placed or performed in visit on 08/06/20   TSH with free T4 reflex     Status: None   Result Value Ref Range    TSH 0.91 0.40 - 4.00 mU/L             Assessment & Plan     1. Acquired hypothyroidism  Pt has been feeling stable on her current dose.   Will refill levothyroxine once TSH is resulted adjust dose if needed.   Addendum- labs are normal- refilled same dose x 1 yr  - TSH with free T4 reflex  - levothyroxine (SYNTHROID/LEVOTHROID) 112 MCG tablet; Take 1 tablet (112 mcg) by mouth daily  Dispense: 90 tablet; Refill: 3    2. Family history of cardiac arrest  Consult and evaluation w/cardiology   - CARDIOLOGY EVAL ADULT REFERRAL; Future    3. Screening for malignant neoplasm of breast  Pt due for mammogram. Order placed.   - MA SCREENING DIGITAL BILAT - Future  (s+30); Future    4. Suspected COVID-19 virus infection  Pt with history of illness requesting jermain testing. Ordered.   No remaining sx.   - COVID-19 Virus (Coronavirus) Antibody & Titer Reflex     BMI:   Estimated body mass index is 44.13 kg/m  as calculated from the following:    Height as of this encounter: 1.695 m (5' 6.73\").    Weight as of this encounter: 126.8 kg (279 lb 8 oz).   Weight management plan: Discussed healthy diet and exercise guidelines        Follow Up: The patient was instructed to contact clinic for worsening symptoms, non-improvement as expected/discussed, and for questions regarding medications or treatment plan. Discussed parameters " for follow up and included in After Visit Summary given to patient.      Return in about 6 months (around 2/6/2021).    Yaneth Jones PA-C  [facility]

## 2020-08-06 ENCOUNTER — OFFICE VISIT (OUTPATIENT)
Dept: FAMILY MEDICINE | Facility: OTHER | Age: 50
End: 2020-08-06
Payer: COMMERCIAL

## 2020-08-06 VITALS
WEIGHT: 279.5 LBS | HEART RATE: 81 BPM | HEIGHT: 67 IN | OXYGEN SATURATION: 100 % | BODY MASS INDEX: 43.87 KG/M2 | RESPIRATION RATE: 18 BRPM | TEMPERATURE: 98 F | SYSTOLIC BLOOD PRESSURE: 118 MMHG | DIASTOLIC BLOOD PRESSURE: 78 MMHG

## 2020-08-06 DIAGNOSIS — Z20.822 SUSPECTED COVID-19 VIRUS INFECTION: ICD-10-CM

## 2020-08-06 DIAGNOSIS — E03.9 ACQUIRED HYPOTHYROIDISM: Primary | ICD-10-CM

## 2020-08-06 DIAGNOSIS — Z12.39 SCREENING FOR MALIGNANT NEOPLASM OF BREAST: ICD-10-CM

## 2020-08-06 DIAGNOSIS — Z82.49 FAMILY HISTORY OF CARDIAC ARREST: ICD-10-CM

## 2020-08-06 LAB — TSH SERPL DL<=0.005 MIU/L-ACNC: 0.91 MU/L (ref 0.4–4)

## 2020-08-06 PROCEDURE — 36415 COLL VENOUS BLD VENIPUNCTURE: CPT | Performed by: PHYSICIAN ASSISTANT

## 2020-08-06 PROCEDURE — 86769 SARS-COV-2 COVID-19 ANTIBODY: CPT | Performed by: PHYSICIAN ASSISTANT

## 2020-08-06 PROCEDURE — 86769 SARS-COV-2 COVID-19 ANTIBODY: CPT | Mod: 59 | Performed by: PHYSICIAN ASSISTANT

## 2020-08-06 PROCEDURE — 84443 ASSAY THYROID STIM HORMONE: CPT | Performed by: PHYSICIAN ASSISTANT

## 2020-08-06 PROCEDURE — 99214 OFFICE O/P EST MOD 30 MIN: CPT | Performed by: PHYSICIAN ASSISTANT

## 2020-08-06 RX ORDER — LEVOTHYROXINE SODIUM 112 UG/1
112 TABLET ORAL DAILY
Qty: 90 TABLET | Refills: 3 | Status: SHIPPED | OUTPATIENT
Start: 2020-08-06 | End: 2021-08-03

## 2020-08-06 ASSESSMENT — PAIN SCALES - GENERAL: PAINLEVEL: MILD PAIN (2)

## 2020-08-06 ASSESSMENT — MIFFLIN-ST. JEOR: SCORE: 1921.14

## 2020-08-06 NOTE — PATIENT INSTRUCTIONS
Plan for cardiology consult. Bring any information you have on your son's diagnosis and surgery.     Will check thyroid and covid jermain labs today    Plan for mammogram-  To schedule your test or specialty referral please call:  [facility]:  Radiology (imaging- mammogram, ultrasound, CT, MRI): [phone]  Speciality consultation: [phone]  [address]  [facility] [address]     Mammogram Options:  [place] Locations:   [place], [place], [place], [place]*, [place]* ([facility]), [facility]*-- Call to schedule [phone]    [place] Locations:  [place], [place], [facility] ([place], [place]*, [facility] [place] [place]*, [place], [place], [place]-- Call to schedule [phone]     [facility] Locations:  [place]* and [facility]-- Call to schedule [phone]    *locations marked with a star have 3D mammography available

## 2020-08-07 ENCOUNTER — TELEPHONE (OUTPATIENT)
Dept: FAMILY MEDICINE | Facility: CLINIC | Age: 50
End: 2020-08-07

## 2020-08-07 NOTE — TELEPHONE ENCOUNTER
Left message asking patient to return call.  Please inform patient of RESULTS from Provider below.     Please contact pt with the following message:     The TSH (thyroid lab test) is at goal on her current dose of levothyroxine. I sent 1 year of refills to her pharmacy.   Yaneth Jones PA-C

## 2020-08-10 ENCOUNTER — TELEPHONE (OUTPATIENT)
Dept: FAMILY MEDICINE | Facility: OTHER | Age: 50
End: 2020-08-10

## 2020-08-10 ENCOUNTER — NURSE TRIAGE (OUTPATIENT)
Dept: FAMILY MEDICINE | Facility: OTHER | Age: 50
End: 2020-08-10

## 2020-08-10 NOTE — TELEPHONE ENCOUNTER
Reason for call:  Patient reporting a symptom    Symptom or request: bleeding and nexplanon replacement    Duration (how long have symptoms been present):     Have you been treated for this before? Yes    Additional comments: please call. She states she would like a call from  regarding this. She declined scheduling    Phone Number patient can be reached at:  Home number on file [phone] (home)    Best Time:  any    Can we leave a detailed message on this number:  YES    Call taken on 8/10/2020 at 8:42 AM by Delia Magana

## 2020-08-10 NOTE — TELEPHONE ENCOUNTER
Spoke to patient. Since we spoke earlier, bleeding has slowed down. Changing pad every 3-4 hours now. Still denies dizziness, pain. Feels fine. Taking a daily iron supplement. She declines seeking care for bleeding at this time, but does still want Nexplanon implant asap. Not currently on any BC. Denies possibility that she is pregnant.    Reviewed soonest available openings with family practice providers. Patient wants to schedule with OB.    Transferred her to scheduling.  Johanna Rodriguez, BSN, RN, PHN

## 2020-08-10 NOTE — TELEPHONE ENCOUNTER
I don't have F2F availability until Wednesday.  Could do a video visit later today, but hard to fully assess via video.

## 2020-08-10 NOTE — TELEPHONE ENCOUNTER
Spoke to patient. Today is day 3 of heavy menstrual bleeding, soaking more than 1 pad per hour since Saturday. She wants a nexplanon implanted today to treat this.    Denies dizziness, weakness  Passing red blood  Denies pain    Recommended ER evaluation now. She declines this. She states the last time she had heavy bleeding like this, the ER told her her hemoglobin was very low and they started her on the BCP to stop it - which worked. Her doctor later implanted Nexplanon. Reports her Nexplanon was removed in 2018 after it was sticking out of her arm causing pain.     Rationale explained for emergent evaluation in the ER d/t resources and to stabilize her before following up with her PCP. She declines ER again, also declines UC now. Writer agreed to route a note to her PCP now.    Huddled with OB Triage RN, placing Nexplanon will not stop bleeding. No openings today with [facility] providers.    Routed to PCP and all providers who do Nexplanon/OB care for work in or to advise.  Johanna Rodriguez, EVANN, RN, PHN        Additional Information    Negative: SEVERE vaginal bleeding (e.g., continuous red blood from vagina, or large blood clots) and very weak (can't stand)    Negative: Passed out (i.e., fainted, collapsed and was not responding)    Negative: Difficult to awaken or acting confused (e.g., disoriented, slurred speech)    Negative: Shock suspected (e.g., cold/pale/clammy skin, too weak to stand, low BP, rapid pulse)    Negative: Sounds like a life-threatening emergency to the triager    Negative: Pregnant > 20 weeks (5 months or more)    Negative: Pregnant < 20 weeks (less than 5 months)    Negative: Postpartum (from 0 to 6 weeks after delivery)    Negative: Vaginal discharge is the main symptom and bleeding is slight    Negative: SEVERE abdominal pain (e.g., excruciating)    Negative: SEVERE dizziness (e.g., unable to stand, requires support to walk, feels like passing out now)    SEVERE vaginal bleeding (i.e.,  soaking 2 pads or tampons per hour and present 2 or more hours; 1 menstrual cup every 2 hours)    Protocols used: VAGINAL BLEEDING - [id number]

## 2020-08-11 LAB
COVID-19 SPIKE RBD ABY TITER: NORMAL
COVID-19 SPIKE RBD ABY: POSITIVE

## 2020-08-12 ENCOUNTER — OFFICE VISIT (OUTPATIENT)
Dept: OBGYN | Facility: OTHER | Age: 50
End: 2020-08-12
Payer: COMMERCIAL

## 2020-08-12 ENCOUNTER — TELEPHONE (OUTPATIENT)
Dept: FAMILY MEDICINE | Facility: CLINIC | Age: 50
End: 2020-08-12

## 2020-08-12 VITALS
SYSTOLIC BLOOD PRESSURE: 118 MMHG | BODY MASS INDEX: 44.41 KG/M2 | WEIGHT: 281.25 LBS | DIASTOLIC BLOOD PRESSURE: 72 MMHG | TEMPERATURE: 98.4 F | HEART RATE: 68 BPM

## 2020-08-12 DIAGNOSIS — N93.9 ABNORMAL UTERINE BLEEDING (AUB): ICD-10-CM

## 2020-08-12 DIAGNOSIS — Z30.46 NEXPLANON REMOVAL: Primary | ICD-10-CM

## 2020-08-12 DIAGNOSIS — Z30.017 NEXPLANON INSERTION: ICD-10-CM

## 2020-08-12 PROCEDURE — 88305 TISSUE EXAM BY PATHOLOGIST: CPT | Performed by: ADVANCED PRACTICE MIDWIFE

## 2020-08-12 PROCEDURE — 11983 REMOVE/INSERT DRUG IMPLANT: CPT | Performed by: ADVANCED PRACTICE MIDWIFE

## 2020-08-12 PROCEDURE — 99213 OFFICE O/P EST LOW 20 MIN: CPT | Mod: 25 | Performed by: ADVANCED PRACTICE MIDWIFE

## 2020-08-12 PROCEDURE — 88305 TISSUE EXAM BY PATHOLOGIST: CPT | Mod: 26 | Performed by: ADVANCED PRACTICE MIDWIFE

## 2020-08-12 NOTE — TELEPHONE ENCOUNTER
Left message asking patient to return call.  Please inform patient of RESULTS from Provider below.         Please contact pt with the following message:     Her coronavirus antibody test was positive.   Yaneth Jones PA-C

## 2020-08-12 NOTE — PATIENT INSTRUCTIONS
Leave the pressure dressing in place for 4-6 hours.  If you feel the dressing is too tight loosen it or remove it completely.  Leave the Band-Aid in place for 24 hours.  Change it if wet.   Leave the steri strip in place until it falls off by itself.  Call the clinic with fever, chills or bleeding from the site.   Use a back up method of birth control such as condoms or abstain from intercourse for 7 days.   Call the clinic for bleeding that is heavier than a normal period for you or if you experience severe pelvic pain.      What Nexplanon Users May Expect    For appropiate patients, Nexplanon is well tolerated and has a low early-removal rate.    Insertion site complications, such as prolonged pain or infection, are rare. Removal is occasionally difficult, and rarely requires a surgical procedure in the operating room.    Menstrual changes are common with Nexplanon. Bleeding may become more or less frequent or heavy, or absent. The bleeding pattern after the first three months is predictive of future bleeding, but the pattern may change at any time. Average bleeding is 18 days over 3 months. Over 50% of women experience rare over absent bleeding over the two year period, while 25% experience frequent or prolonged bleeding.    In clinical studies, users gained 3.7 pounds over two years. It is unknown what portion of this weight gain is related to Nexplanon    Women with a history of depressed mood may have worsening on Nexplanon, and may need to have the device removed.    Return to baseline ovulation patterns is seen 7-14 days after removal of Nexplanon.    Rarely, headaches and acne have also let to device removal.    Nexplanon may be less effective in women weight more than 130% of their ideal body weight.    Nexplanon does not protect against HIV or STDs.    Please call [facility] at [phone] if you have questions or concerns.    For more complete  information:  [email or web address]

## 2020-08-12 NOTE — PROGRESS NOTES
enometr  Jerrica Pappas is a 50 year old who presents to the clinic for evaluation of AUB.  Had a nexplanon inserted in 2015.  Worked very well to control her bleeding. It was removed when it was  and she didn't have any periods until last week.   Has had her tubes tied.  Agrees today to an EMB to evaluate the endometrium for hyperplasia.       Histories reviewed and updated  Past Medical History:   Diagnosis Date     Thyroid disorder      Past Surgical History:   Procedure Laterality Date     LAPAROTOMY MINI, TUBAL LIGATION, COMBINED       OTHER SURGICAL HISTORY      cyst on ears      Social History     Socioeconomic History     Marital status:      Spouse name: Not on file     Number of children: Not on file     Years of education: Not on file     Highest education level: Not on file   Occupational History     Not on file   Social Needs     Financial resource strain: Not on file     Food insecurity     Worry: Not on file     Inability: Not on file     Transportation needs     Medical: Not on file     Non-medical: Not on file   Tobacco Use     Smoking status: Never Smoker     Smokeless tobacco: Never Used   Substance and Sexual Activity     Alcohol use: No     Drug use: No     Sexual activity: Not Currently     Partners: Male     Birth control/protection: Surgical, Implant     Comment: tubal   Lifestyle     Physical activity     Days per week: Not on file     Minutes per session: Not on file     Stress: Not on file   Relationships     Social connections     Talks on phone: Not on file     Gets together: Not on file     Attends [employer] service: Not on file     Active member of club or organization: Not on file     Attends meetings of clubs or organizations: Not on file     Relationship status: Not on file     Intimate partner violence     Fear of current or ex partner: Not on file     Emotionally abused: Not on file     Physically abused: Not on file     Forced sexual activity: Not on file   Other  Topics Concern     Parent/sibling w/ CABG, MI or angioplasty before 65F 55M? Not Asked   Social History Narrative     Not on file     Family History   Problem Relation Age of Onset     No Known Problems Mother      Hypertension Father      Diabetes Type 2  Sister      Schizophrenia Brother         ?schizophrenia, better on meds     Arrhythmia Son         sudden cardiac arrest     Hypertension Sister      Diabetes Brother      Diabetes Brother           ROS: 10 point ROS neg other than the symptoms noted above in the HPI.    EXAM:  /72 (BP Location: Right arm, Patient Position: Sitting, Cuff Size: Adult Large)   Pulse 68   Temp 98.4  F (36.9  C) (Oral)   Wt 127.6 kg (281 lb 4 oz)   LMP 08/07/2020 (Exact Date)   BMI 44.41 kg/m    GENERAL:  Pleasant female, no acute distress  EYES: sclera clear  RESP: breathing unlabored  CV: extremities without edema  M/S: no gross deformities  Neuro:  normal strength and sensation  : PELVIC EXAM:  Vulva: No external lesions, normal hair distribution, no adenopathy, BUS WNL  Vagina: Moist, pink, no abnormal discharge, well rugated, no lesions  Cervix: smooth, pink, no visible lesions, neg CMT  Uterus: Normal size, mid to antiverted, non-tender, mobile  Ovaries: No mass, non-tender, mobile  Rectal exam: deferred  Psych:  alert, with normal speech and behavior      Jerrica Pappas is a 50 year old who presents to the clinic for an endometrial biopsy due to   AUB     Procedure:  Endometrial biopsy      Discussed risk of bleeding, infection, uterine perforation, cramping pain.  Pt agreed to proceed with procedure after all questions answered and consent signed.      Speculum placed and cervix visualized.  Cervix cleansed with betadine x 3.   Endometrial biopsy pipelle passed through cervix and uterus sounded to 8 cm.  Biopsy specimen collected with one pass with return of moderate amount of pink tissue.  Specimen placed in a labeled container and set aside to be sent to  pathology.   No bleeding noted from cervical os.     Patient tolerated the procedure well.  There were no apparent complications and bleeding was minimal.    See AVS for instructions    ASSESSMENT/PLAN:    (Z30.46) Nexplanon removal  (primary encounter diagnosis)  Comment:   Plan: ETONOGESTREL IMPLANT SYSTEM, INSERTION         NON-BIODEGRADABLE DRUG DELIVERY IMPLANT,         REMOVAL NON-BIODEGRADABLE DRUG DELIVERY IMPLANT            (Z30.017) Nexplanon insertion  Comment:   Plan: ETONOGESTREL IMPLANT SYSTEM, INSERTION         NON-BIODEGRADABLE DRUG DELIVERY IMPLANT,         REMOVAL NON-BIODEGRADABLE DRUG DELIVERY IMPLANT            (N93.9) Abnormal uterine bleeding (AUB)  Comment:   Plan: ETONOGESTREL IMPLANT SYSTEM, INSERTION         NON-BIODEGRADABLE DRUG DELIVERY IMPLANT,         REMOVAL NON-BIODEGRADABLE DRUG DELIVERY         IMPLANT, ENDOMETRIAL BIOPSY W/O CERVICAL         DILATION, Surgical pathology exam                Visit length 20 minutes was spent face to face with the patient today discussing her history, diagnosis, and follow-up plan as noted above.  Over 50% of the visit was spent in counseling and coordination of care.    Time noted does not include time required to complete procedures.               NEXPLANON REMOVAL AND INSERTION PROCEDURE    Jerrica Pappas is a 50 year old  who presents for Nexplanon insertion. No LMP recorded. (Menstrual status: Tubal ).  The patient is currently using tubal ligation  for contraception.       Discussed risks of bleeding and infection with placement and the insertion procedure. Also discussed the possibility of irregular bleeding for 3-6 months and then often cessation of menses but possibility of continued abnormal bleeding. Small risk of migration of the Nexplanon or difficulty removing the Nexplanon. We also discussed possible side effects of weight gain, skin or hair changes, alterations in mood and increase in headaches.  Lasts for 3 years at which time  she could have this one removed and another replaced. All questions answered and consent form signed.       Preprocedure medications: 1% plain lidocaine, 1-2 ml  Nexplanon Lot # [id number]   [id number]   Exp date:6/26/2022   NDC 8679-8557-62        All equipment required was ready and available.  Patients allergies were confirmed.  The patient was placed in the supine position with her left (non-dominant) arm flexed at the elbow, externally rotated, and placed with her wrist parallel to her ear.  The removal site was identified  above the elbow crease at the inner aspect of the bicep.  The removal and reinsertion site was marked with a sterile marker. The direction of insertion was also indicated with a verna 6-8 cm proximal beneath bicepital groove.  The  area was cleaned with betadine swabs and anesthetized with 0.1 cc of 1% lidocaine without epinephrine.  A small skin incision made using a #11 blade.  The Nexplanon was gently manipulated until the tip was at the incision. The jaspreet tip was grasped with a  Carole clamp and was gently removed from the incision.  Both jaspreet tips were inspected following removal and found to be completely intact.  The area for the jaspreet insertion was then anesthetized with 2 cc of 1% lidocaine without epinepherine  The Nexplanon was removed from its blister.  The needle shield was removed.  Counter-traction was applied to the skin at the marked needle insertion site.  The tip of the needle was inserted at the site, beveled side up, at a slight angle.  The applicator was then lowered to a horizontal position.  The needle was inserted to its full length, keeping the needle parallel to the surface of the skin and the skin tented.   The cannula was retracted against the obturator.  The 4 cm jaspreet was palpated under the skin.  The patient also palpated the jaspreet.  A pressure bandage was applied with sterile gauze. The patient was instructed to remove the bangage in several hours and replace with a  band-aid.        The user card was filled out and given to the patient to keep.    PLAN:   The patient was asked to contact the clinic for any fever/chills/severe pelvic or abdominal pain or heavy bleeding.     FOLLOW-UP:  She was asked to follow up for any problems.

## 2020-08-14 LAB — COPATH REPORT: NORMAL

## 2020-09-15 ENCOUNTER — OFFICE VISIT (OUTPATIENT)
Dept: FAMILY MEDICINE | Facility: OTHER | Age: 50
End: 2020-09-15
Payer: COMMERCIAL

## 2020-09-15 ENCOUNTER — ANCILLARY PROCEDURE (OUTPATIENT)
Dept: GENERAL RADIOLOGY | Facility: OTHER | Age: 50
End: 2020-09-15
Attending: PHYSICIAN ASSISTANT
Payer: COMMERCIAL

## 2020-09-15 ENCOUNTER — TELEPHONE (OUTPATIENT)
Dept: CARDIOLOGY | Facility: CLINIC | Age: 50
End: 2020-09-15

## 2020-09-15 VITALS
HEART RATE: 86 BPM | SYSTOLIC BLOOD PRESSURE: 114 MMHG | OXYGEN SATURATION: 99 % | TEMPERATURE: 98.5 F | DIASTOLIC BLOOD PRESSURE: 70 MMHG | RESPIRATION RATE: 16 BRPM | BODY MASS INDEX: 44.05 KG/M2 | WEIGHT: 279 LBS

## 2020-09-15 DIAGNOSIS — K59.00 CONSTIPATION, UNSPECIFIED CONSTIPATION TYPE: Primary | ICD-10-CM

## 2020-09-15 DIAGNOSIS — K59.00 CONSTIPATION, UNSPECIFIED CONSTIPATION TYPE: ICD-10-CM

## 2020-09-15 DIAGNOSIS — K64.8 INTERNAL HEMORRHOID: ICD-10-CM

## 2020-09-15 PROCEDURE — 99214 OFFICE O/P EST MOD 30 MIN: CPT | Performed by: PHYSICIAN ASSISTANT

## 2020-09-15 PROCEDURE — 74019 RADEX ABDOMEN 2 VIEWS: CPT

## 2020-09-15 RX ORDER — POLYETHYLENE GLYCOL 3350 17 G/17G
1 POWDER, FOR SOLUTION ORAL DAILY
COMMUNITY
End: 2021-11-11

## 2020-09-15 ASSESSMENT — PAIN SCALES - GENERAL: PAINLEVEL: MILD PAIN (2)

## 2020-09-15 NOTE — PATIENT INSTRUCTIONS
1. Miralax clean out as below     2. Start after clean out, fiber supplement   - Start with 1 of selected product (pills, gummies, tablets, etc.)      Recommend Psyllium Husk Fiber      Increase every 1-2 weeks as tolerated until soft daily bowel movement (most are max 8 per day)       1 at night before bed for 1-2 weeks, then 2 at night before bed for 1-2 weeks, etc. Etc.           Miralax clean out protocol   Start a clear liquid diet after breakfast.  A clear liquid diet consists of soda, juices without pulp, broth, Jell-O, Popsicles, Italian ice, hard candies (if age appropriate).  Pretty much anything you can see through!!  (NO dairy products or solid food.)    You will need:  1. 32 or 64 oz. of flavored Pedialyte or Gatorade (See Below)  2. One 255 gram bottle of Miralax  3. 2 or 3 bisacodyl (Dulcolax) tablets     These are all available without prescription.      Around 12 Noon on the day of the clean out, mix the entire container of Miralax (255 gr) in 64 oz. (or half a container in 32 ounces) of Pedialyte or  Gatorade. Leave this Miralax mixture in the refrigerator for one hour to help the Miralax dissolve, and to help the mixture taste better.  Note, the dose we re suggesting is for a bowel  cleanout.   It is not the dose that is written on the bottle, which is designed for daily softening of stool.  We need this higher dose so that the cleanout will work.      Drink 8-12 oz. of the MiraLax-electrolyte solution mixture every 15-20 minutes until the entire 64 oz mixture is consumed.  It is very important to drink all 64 oz of the MiraLax-electrolyte solution.     Within 30 min of finishing the MiraLax-electrolyte solution mixture, take the 3 bisacodyl (Dulcolax) tablets with 8-12 oz. of clear liquid (these tabs can be crushed).  (Note that the package instructions may direct not to take more than two tablets at a time, but for this preparation take three).         Patient Education      Hemorrhoids    Hemorrhoids are swollen and inflamed veins inside the rectum and near the anus. The rectum is the last several inches of the colon. The anus is the passage between the rectum and the outside of the body.  Causes  The veins can become swollen due to increased pressure in them. This is most often caused by:    Chronic constipation or diarrhea    Straining when having a bowel movement    Sitting too long on the toilet    A low-fiber diet    Pregnancy  Symptoms    Bleeding from the rectum (this may be noticeable after bowel movements)    Lump near the anus    Itching around the anus    Pain around the anus  There are different types of hemorrhoids. Depending on the type you have and the severity, you may be able to treat yourself at home. In some cases, a procedure may be the best treatment option. Your healthcare provider can tell you more about this, if needed.  Home care  General care    To get relief from pain or itching, try:  ? Medicines. Your healthcare provider may recommend stool softeners, suppositories, or laxatives to help manage constipation. Use these exactly as directed.  ? Sitz baths. A sitz bath involves sitting in a few inches of warm bath water. Be careful not to make the water so hot that you burn yourself--test it before sitting in it. Soak for about 10 to 15 minutes a few times a day. This may help relieve pain.  ? Topical products. Your healthcare provider may prescribe or recommend creams, ointments, or pads that can be applied to the hemorrhoid. Use these exactly as directed.  Tips to help prevent hemorrhoids    Eat more fiber. Fiber adds bulk to stool and absorbs water as it moves through your colon. This makes stool softer and easier to pass.  ? Increase the fiber in your diet with more fiber-rich foods. These include fresh fruit, vegetables, and whole grains.  ? Take a fiber supplement or bulking agent, if advised by your healthcare provider. These include products such as  psyllium or methylcellulose.    Drink more water. Your healthcare provider may direct you to drink plenty of water. This can help keep stool soft.    Be more active. Frequent exercise aids digestion and helps prevent constipation. It may also help make bowel movements more regular.    Don t strain during bowel movements. This can make hemorrhoids more likely. Also, don t sit on the toilet for long periods of time.  Follow-up care  Follow up with your healthcare provider as advised. If a culture or imaging tests were done, someone will let you know the results when they are ready. This may take a few days or longer. If your healthcare provider recommends a procedure for your hemorrhoids, these options can be discussed. Options may include surgery and outpatient office treatments.  When to seek medical advice  Call your healthcare provider right away if any of these occur:    Increased bleeding from the rectum    Increased pain around the rectum or anus    Weakness or dizziness  Call 911  Call 911 if any of these occur:    Trouble breathing or swallowing    Fainting or loss of consciousness    Unusually fast heart rate    Vomiting blood    Large amounts of blood in stool or black, tarry stools  Date Last Reviewed: 9/1/2017 2000-2019 [employer]. [address] [place] [address], [place] [address]. All rights reserved. This information is not intended as a substitute for professional medical care. Always follow your healthcare professional's instructions.

## 2020-09-15 NOTE — TELEPHONE ENCOUNTER
Returned pt call and she states she has never seen a cardiologist in the past. Nor has she every seen any one for heart related issues.     Yaneth Snider, MARICEL......September 15, 2020     1:57 PM

## 2020-09-15 NOTE — PROGRESS NOTES
Subjective     Jerrica Pappas is a 50 year old female who presents to clinic today for the following health issues:    HPI       Constipation  Onset/Duration  She had loose stools during the Time she had Covid    -  Started in the end part of July  Description:  Frequency of bowel movements: 3-4 times per week   Consistency of stool: Hard  Progression of Symptoms: worsening  Accompanying signs and symptoms:    Abdominal pain: Sometimes, cramps, mainly lower parts    Rectal pain: Sometimes   Blood in stool: Yes - first time yesterday-  Bright red, on tissue and in stool     Nausea/Vomiting: no   Weight loss or gain: no  History:   Similar problems in past: no  History of abdominal surgery: no  Chronic laxative use:   Just recently  New medications: no  Precipitating or alleviating factors:   Not sure  Therapies tried and outcome:   Senna, Dulcolax and now  Mirlax-   Not helping     - Bloated   - [place] type 1 yesterday was last BM   - Before COVID, had 2/day normal             Review of Systems   Constitutional, HEENT, cardiovascular, pulmonary, gi and gu systems are negative, except as otherwise noted.      Objective    /70   Pulse 86   Temp 98.5  F (36.9  C)   Resp 16   Wt 126.6 kg (279 lb)   SpO2 99%   BMI 44.05 kg/m    Body mass index is 44.05 kg/m .  Physical Exam   GENERAL APPEARANCE: healthy, alert and no distress  EYES: Eyes grossly normal to inspection, PERRLA, conjunctivae and sclerae without injection or discharge, EOM intact   RESP: Lungs clear to auscultation - no rales, rhonchi or wheezes    CV: Regular rates and rhythm, normal S1 S2, no S3 or S4, no murmur, click or rub, no peripheral edema and peripheral pulses strong and symmetric bilaterally   ABD: Limited by body habitus, Normal bowel sounds, no tenderness, no rebound or guarding, no masses, no hepatosplenomegaly   Rectal exam: rectal normal, no masses, no external hemorrhoid, positive for internal hemorrhoid   MS: No musculoskeletal  defects are noted and gait is age appropriate without ataxia   SKIN: No suspicious lesions or rashes, hydration status appears adeuqate with normal skin turgor   PSYCH: Alert and oriented x3; speech- coherent , normal rate and volume; able to articulate logical thoughts, able to abstract reason, no tangential thoughts, no hallucinations or delusions, mentation appears normal, Mood is euthymic. Affect is appropriate for this mood state and bright. Thought content is free of suicidal ideation, hallucinations, and delusions. Dress is adequate and upkept. Eye contact is good during conversation.       Diagnostics:     XR KUB: Preliminary, moderate stool present, no acute changes, no air fluid levels, normal bowel gas pattern, await final from radiology           Assessment & Plan     ICD-10-CM    1. Constipation, unspecified constipation type  K59.00 XR Abdomen 2 Views   2. Internal hemorrhoid  K64.8      - Likely constipation after GI upset from COVID-19 infection   - XR did show continued stool and hemorrhoid on exam   - Discussed both etiology and treatment options   - Hemorrhoid not painful, so discussed soft stools as way to prevent worsening, hand out given   - For constipation      Recommend bowel clean out protocol followed by daily fiber to help return to normal function      Discussed all this at length     Instructions given in AVS     - Discussed warning signs that would warrant return to clinic/ED     The patient indicates understanding of these issues and agrees with the plan.    Return in about 1 week (around 9/22/2020) for if not improving.    Renetta Wheeler PA-C  [facility] [place]

## 2020-09-15 NOTE — TELEPHONE ENCOUNTER
Left message for pt to return call.     Reason for visit: family hx of MI.    *Have you ever seen a cardiologist?   *If so, who did you see?   *Where did you see them?   *When did you see them?    *Do you have any cardiac records outside of the [facility] system?    Yaneth Snider CMA......September 15, 2020     10:56 AM

## 2020-09-23 NOTE — PROGRESS NOTES
Chief complaint: Family history of SCD    HPI:   Jerrica Pappas is a 50 year old female with history of hypothyroidism presenting for her initial cardiology consultation for a family history of cardiac arrest.    Pt reports her 14 yr old son had cardiac arrest while playing football outdoors with friends Oct 25, 2019. He was running/playing and collapsed. A friend's parent administered CPR at the scene. EMS was called and was brought to [facility] in [place]. Was diagnosed with anomalous RCA and underwent corrective surgery.    Since surgery, son is doing well. He is back to daily activities, but not playing sports yet.    Patient reports that she had COVID in July and has had increased SOB and CP with activities. This occurs with only walking 1/2 miles. Prior to COVID she could walk 2-3 miles w/o symptoms.     Pt denies any syncope or palpitations.    PAST MEDICAL HISTORY:  Past Medical History:   Diagnosis Date     Thyroid disorder        CURRENT MEDICATIONS:  Current Outpatient Medications   Medication Sig Dispense Refill     levothyroxine (SYNTHROID/LEVOTHROID) 112 MCG tablet Take 1 tablet (112 mcg) by mouth daily 90 tablet 3     aspirin (ASA) 81 MG chewable tablet Take 81 mg by mouth daily       polyethylene glycol (MIRALAX) 17 g packet Take 1 packet by mouth daily         PAST SURGICAL HISTORY:  Past Surgical History:   Procedure Laterality Date     LAPAROTOMY MINI, TUBAL LIGATION, COMBINED       OTHER SURGICAL HISTORY      cyst on ears        ALLERGIES:     Allergies   Allergen Reactions     Chloroquine        FAMILY HISTORY:  Family History   Problem Relation Age of Onset     No Known Problems Mother      Hypertension Father      Diabetes Type 2  Sister      Schizophrenia Brother         ?schizophrenia, better on meds     Arrhythmia Son         sudden cardiac arrest     Hypertension Sister      Diabetes Brother      Diabetes Brother          SOCIAL HISTORY:  Social History     Tobacco Use      Smoking status: Never Smoker     Smokeless tobacco: Never Used   Substance Use Topics     Alcohol use: No     Drug use: No       ROS:   A comprehensive 14 point review of systems is negative other than as mentioned in HPI.    Exam:  /69 (BP Location: Left arm, Patient Position: Sitting, Cuff Size: Adult Large)   Pulse 89   Wt 124.4 kg (274 lb 4.8 oz)   SpO2 96%   BMI 43.31 kg/m    GENERAL APPEARANCE: healthy, alert and no distress  EYES: no icterus, no xanthelasmas  ENT: normal palate, mucosa moist, no central cyanosis  NECK: JVP not elevated  RESPIRATORY: lungs clear to auscultation - no rales, rhonchi or wheezes, no use of accessory muscles, no retractions, respirations are unlabored, normal respiratory rate  CARDIOVASCULAR: regular rhythm, normal S1 with physiologic split S2, no S3 or S4 and no murmur, click or rub.  GI: soft, non tender, bowel sounds normal,no abdominal bruits  EXTREMITIES: no edema, no bruits  NEURO: alert and oriented to person/place/time, normal speech, gait and affect  VASC: Radial, dorsalis pedis and posterior tibialis pulses 2+ bilaterally.  SKIN: no ecchymoses, no rashes.  PSYCH: cooperative, affect appropriate.     Labs:  Reviewed.       Testing/Procedures:      I personally visualized and interpreted:    ECG from 9/24/20:  Sinus rhythm with rate of 86 BPM, QRS 86 ms, TWI in III, FL interval 162 ms.      Assessment and Plan:   Jerrica Pappas is a 50 year old female with history of hypothyroidism presenting for her initial cardiology consultation for a family history of cardiac arrest.    #Family history of aborted sudden cardiac death:  #Family history of Anamoulous RCA:  #Exertional angina:  Impression is that will want to assess for congenital heart disease given abnormality in 1st degree relative. Her exertional symptoms that started after having COVID infection is less likely to be ischemia related, but will perform an ischemic evaluation.  Plan:  -Stress cardiac MRI to  eval for ischemia and congenital heart disease including anomalous coronary arteries  -BMP to eval renal function prior to gadolinium contrast  -14 day Zio patch to be placed after CMR    The patient states understanding and is agreeable with plan.     Plan discussed with Dr. Cordova.    Chano Chávez MD  Cardiology    ATTENDING ATTESTATION     Patient was seen and evaluated with Dr. Chávez. History was confirmed personally by me. Labs, imaging studies, EKGs, and telemetry were reviewed. Agree with assessment and plan as outlined above. The note has been edited by me as needed to produce a single, cohesive document.    50 year old woman with no prior history with family history of aborted sudden cardiac death in her teenage son, who was ultimately found to have anomalous RCA, now presenting with exertional chest pain and also for evaluation for structural heart disease or risk of arrhythmia.    We have ordered a stress cardiac MRI which will evaluate for structural cardiac lesions (including congenital heart disease and anomalous coronary origin), structural nidus for arrhythmia, and ischemia. We have also ordered a 14-day ZioPatch monitor (to be placed after stress MRI) for surveillance for subclinical arrhythmias (e.g., nonsustained VT) which would potentially increase suspicion for arrhythmic tendency.     Total time spent 50 minutes (greater than allotted), of which >50% was spent in face-to-face patient evaluation, reviewing data with patient, and coordination of care.     Darío Cordova MD  Staff Cardiologist    ADDENDUM:  Exercise ECG would be an inappropriate study for this patient due to her baseline abnormal ECG (T wave inversions) rendering a treadmill ECG non-diagnostic for assessment of ischemia. Furthermore, given suspicion for anomalous coronary artery (based on family history), an exercise study without knowledge of her coronary anatomy would pose a risk of exercise-induced ventricular  arrhythmias during the study.  Stress cardiac MRI is consequently both the safest and most diagnostically-appropriate testing strategy.    Darío Cordova MD  Cardiology

## 2020-09-24 ENCOUNTER — OFFICE VISIT (OUTPATIENT)
Dept: CARDIOLOGY | Facility: CLINIC | Age: 50
End: 2020-09-24
Attending: PHYSICIAN ASSISTANT
Payer: COMMERCIAL

## 2020-09-24 VITALS
DIASTOLIC BLOOD PRESSURE: 69 MMHG | HEART RATE: 89 BPM | SYSTOLIC BLOOD PRESSURE: 111 MMHG | OXYGEN SATURATION: 96 % | WEIGHT: 274.3 LBS | BODY MASS INDEX: 43.31 KG/M2

## 2020-09-24 DIAGNOSIS — Z82.79 FAMILY HISTORY OF CONGENITAL ANOMALIES: ICD-10-CM

## 2020-09-24 DIAGNOSIS — I20.89 STABLE ANGINA PECTORIS (H): ICD-10-CM

## 2020-09-24 DIAGNOSIS — R00.2 PALPITATIONS: ICD-10-CM

## 2020-09-24 DIAGNOSIS — Z82.49 FAMILY HISTORY OF CARDIAC ARREST: Primary | ICD-10-CM

## 2020-09-24 PROCEDURE — 93000 ELECTROCARDIOGRAM COMPLETE: CPT | Performed by: INTERNAL MEDICINE

## 2020-09-24 PROCEDURE — 99204 OFFICE O/P NEW MOD 45 MIN: CPT | Mod: 25 | Performed by: INTERNAL MEDICINE

## 2020-09-24 ASSESSMENT — PAIN SCALES - GENERAL: PAINLEVEL: NO PAIN (0)

## 2020-09-24 NOTE — NURSING NOTE
Jerrica Pappas's goals for this visit include:   Chief Complaint   Patient presents with     Consult     family Hx of MI       She requests these members of her care team be copied on today's visit information: no    PCP: Renetta Wheeler    Referring Provider:  Yaneth Jones PA-C  [address] [place] [address]    /69 (BP Location: Left arm, Patient Position: Sitting, Cuff Size: Adult Large)   Pulse 89   Wt 124.4 kg (274 lb 4.8 oz)   SpO2 96%   BMI 43.31 kg/m      Do you need any medication refills at today's visit? No    Yaneth Snider CMA......September 24, 2020     3:22 PM

## 2020-09-25 ENCOUNTER — TELEPHONE (OUTPATIENT)
Dept: CARDIOLOGY | Facility: CLINIC | Age: 50
End: 2020-09-25

## 2020-09-25 NOTE — TELEPHONE ENCOUNTER
9/25 Called and left voicemail. Provided phone number [phone] to scheduled stress mri and zio patch. Provided phone number [phone] to schedule follow up with Dr. Cordova in 6-8 weeks after imaging has been completed.     Tanvi Hop   Procedure    Ortho/Sports Med/Pod/Ent/Eye/Surgical Specialties  [facility] [place]   [phone]    ----- Message from MONIE WOODALL sent at 9/24/2020  4:15 PM CDT -----  Regarding: Stress MRI at the [facility] / Zio patch placement and follow up with cards  Please schedule pt for a stress MRI next available at the . Patient will then need a 14 day Zio Patch placed after the stress MRI is completed. Schedule follow up with Dr. Cordova 6- 8 weeks after these things have been completed. Thank you!    Monie Woodall LPN    Rheumatology / Pulmonology  [facility]

## 2020-10-02 NOTE — TELEPHONE ENCOUNTER
10/2 2nd attempt.  Called and left voicemail. Provided phone number [phone] to scheduled stress mri and zio patch. Provided phone number [phone] to schedule follow up with Dr. Cordova in 6-8 weeks after imaging has been completed.      Tanvi Spence          Procedure    Ortho/Sports Med/Pod/Ent/Eye/Surgical Specialties  [facility] [place]   [phone]

## 2020-10-13 ENCOUNTER — ANCILLARY PROCEDURE (OUTPATIENT)
Dept: CARDIOLOGY | Facility: CLINIC | Age: 50
End: 2020-10-13
Attending: STUDENT IN AN ORGANIZED HEALTH CARE EDUCATION/TRAINING PROGRAM
Payer: COMMERCIAL

## 2020-10-13 DIAGNOSIS — R00.2 PALPITATIONS: ICD-10-CM

## 2020-10-13 PROCEDURE — 0296T LEADLESS EKG MONITOR 3 TO 14 DAYS: CPT | Performed by: INTERNAL MEDICINE

## 2020-10-13 PROCEDURE — 0298T LEADLESS EKG MONITOR 3 TO 14 DAYS: CPT | Performed by: INTERNAL MEDICINE

## 2020-10-13 NOTE — PATIENT INSTRUCTIONS
Patient has been prescribed a ZioPatch holter for 14 days. Patient was instructed regarding the indication, function, care and prompt return of the ZioPatch holter monitor. The monitor, with S/N [id number],  was placed on the patient with instructions regarding care of the skin and monitor, as well as documentation in the patient diary. Patient demonstrated understanding of this information and [facility] or [facility].

## 2020-11-03 ENCOUNTER — HOSPITAL ENCOUNTER (OUTPATIENT)
Dept: MRI IMAGING | Facility: CLINIC | Age: 50
Discharge: HOME | End: 2020-11-03
Attending: STUDENT IN AN ORGANIZED HEALTH CARE EDUCATION/TRAINING PROGRAM | Admitting: STUDENT IN AN ORGANIZED HEALTH CARE EDUCATION/TRAINING PROGRAM
Payer: COMMERCIAL

## 2020-11-03 ENCOUNTER — TELEPHONE (OUTPATIENT)
Dept: FAMILY MEDICINE | Facility: OTHER | Age: 50
End: 2020-11-03

## 2020-11-03 VITALS
DIASTOLIC BLOOD PRESSURE: 74 MMHG | HEART RATE: 81 BPM | SYSTOLIC BLOOD PRESSURE: 120 MMHG | RESPIRATION RATE: 18 BRPM | OXYGEN SATURATION: 98 %

## 2020-11-03 DIAGNOSIS — Z82.49 FAMILY HISTORY OF CARDIAC ARREST: ICD-10-CM

## 2020-11-03 DIAGNOSIS — Z82.79 FAMILY HISTORY OF CONGENITAL ANOMALIES: ICD-10-CM

## 2020-11-03 PROCEDURE — 999N000127 HC STATISTIC PERIPHERAL IV START W US GUIDANCE

## 2020-11-03 PROCEDURE — 75565 CARD MRI VELOC FLOW MAPPING: CPT | Mod: 26 | Performed by: INTERNAL MEDICINE

## 2020-11-03 PROCEDURE — 250N000011 HC RX IP 250 OP 636: Performed by: INTERNAL MEDICINE

## 2020-11-03 PROCEDURE — 93016 CV STRESS TEST SUPVJ ONLY: CPT | Performed by: INTERNAL MEDICINE

## 2020-11-03 PROCEDURE — 75563 CARD MRI W/STRESS IMG & DYE: CPT

## 2020-11-03 PROCEDURE — 255N000002 HC RX 255 OP 636: Performed by: STUDENT IN AN ORGANIZED HEALTH CARE EDUCATION/TRAINING PROGRAM

## 2020-11-03 PROCEDURE — 75563 CARD MRI W/STRESS IMG & DYE: CPT | Mod: 26 | Performed by: INTERNAL MEDICINE

## 2020-11-03 PROCEDURE — 93018 CV STRESS TEST I&R ONLY: CPT | Performed by: INTERNAL MEDICINE

## 2020-11-03 PROCEDURE — 93010 ELECTROCARDIOGRAM REPORT: CPT | Mod: 76 | Performed by: INTERNAL MEDICINE

## 2020-11-03 PROCEDURE — 999N000054 HC STATISTIC EKG NON-CHARGEABLE

## 2020-11-03 PROCEDURE — A9585 GADOBUTROL INJECTION: HCPCS | Performed by: STUDENT IN AN ORGANIZED HEALTH CARE EDUCATION/TRAINING PROGRAM

## 2020-11-03 PROCEDURE — 93005 ELECTROCARDIOGRAM TRACING: CPT

## 2020-11-03 PROCEDURE — 93017 CV STRESS TEST TRACING ONLY: CPT

## 2020-11-03 RX ORDER — ALBUTEROL SULFATE 90 UG/1
2 AEROSOL, METERED RESPIRATORY (INHALATION) EVERY 5 MIN PRN
Status: DISCONTINUED | OUTPATIENT
Start: 2020-11-03 | End: 2020-11-04 | Stop reason: HOSPADM

## 2020-11-03 RX ORDER — ACYCLOVIR 200 MG/1
0-1 CAPSULE ORAL
Status: DISCONTINUED | OUTPATIENT
Start: 2020-11-03 | End: 2020-11-04 | Stop reason: HOSPADM

## 2020-11-03 RX ORDER — GADOBUTROL 604.72 MG/ML
10 INJECTION INTRAVENOUS ONCE
Status: COMPLETED | OUTPATIENT
Start: 2020-11-03 | End: 2020-11-03

## 2020-11-03 RX ORDER — DIPHENHYDRAMINE HYDROCHLORIDE 50 MG/ML
25-50 INJECTION INTRAMUSCULAR; INTRAVENOUS
Status: DISCONTINUED | OUTPATIENT
Start: 2020-11-03 | End: 2020-11-04 | Stop reason: HOSPADM

## 2020-11-03 RX ORDER — REGADENOSON 0.08 MG/ML
0.4 INJECTION, SOLUTION INTRAVENOUS ONCE
Status: COMPLETED | OUTPATIENT
Start: 2020-11-03 | End: 2020-11-03

## 2020-11-03 RX ORDER — AMINOPHYLLINE 25 MG/ML
100 INJECTION, SOLUTION INTRAVENOUS ONCE
Status: COMPLETED | OUTPATIENT
Start: 2020-11-03 | End: 2020-11-03

## 2020-11-03 RX ORDER — DIPHENHYDRAMINE HCL 25 MG
25 CAPSULE ORAL
Status: DISCONTINUED | OUTPATIENT
Start: 2020-11-03 | End: 2020-11-04 | Stop reason: HOSPADM

## 2020-11-03 RX ORDER — ONDANSETRON 2 MG/ML
4 INJECTION INTRAMUSCULAR; INTRAVENOUS
Status: DISCONTINUED | OUTPATIENT
Start: 2020-11-03 | End: 2020-11-04 | Stop reason: HOSPADM

## 2020-11-03 RX ORDER — METHYLPREDNISOLONE SODIUM SUCCINATE 125 MG/2ML
125 INJECTION, POWDER, LYOPHILIZED, FOR SOLUTION INTRAMUSCULAR; INTRAVENOUS
Status: DISCONTINUED | OUTPATIENT
Start: 2020-11-03 | End: 2020-11-04 | Stop reason: HOSPADM

## 2020-11-03 RX ORDER — DIAZEPAM 5 MG
5 TABLET ORAL EVERY 30 MIN PRN
Status: DISCONTINUED | OUTPATIENT
Start: 2020-11-03 | End: 2020-11-04 | Stop reason: HOSPADM

## 2020-11-03 RX ADMIN — AMINOPHYLLINE 100 MG: 25 INJECTION, SOLUTION INTRAVENOUS at 10:48

## 2020-11-03 RX ADMIN — REGADENOSON 0.4 MG: 0.08 INJECTION, SOLUTION INTRAVENOUS at 10:45

## 2020-11-03 RX ADMIN — GADOBUTROL 10 ML: 604.72 INJECTION INTRAVENOUS at 10:11

## 2020-11-03 NOTE — PROGRESS NOTES
Patient presents for cardiac stress MRI and denies caffeine consumption in the past 12 hours.  Patient is educated on procedure for cardiac stress MRI including the medications that will be used and their possible side effects.  Lungs are clear bilaterally.  Patient tolerated the Lexiscan injection reporting no side effects. Aminophylline is given per protocol and patient is monitored x 10 mn, then is left under the care of MRI staff.

## 2020-11-03 NOTE — TELEPHONE ENCOUNTER
Panel Management Review      Patient has the following on her problem list: None      Composite cancer screening  Chart review shows that this patient is due/due soon for the following Mammogram  Summary:    Patient is due/failing the following:   TSH, mammogram, flu shot, FOLLOW UP and PHYSICAL    Action needed:   Patient needs office visit for annual physical. Due now.  Patient needs office visit for thyroid recheck. Due 2/6/21.  Patient needs lab only for TSH recheck. Order placed.  Patient needs nurse only appointment for flu shot. Patient needs referral/order: mammogram. Order placed.    Type of outreach:    Phone, left message for patient to call back.     Questions for provider review:    None                                                                                                                                    Almaz Wilkinson CMA ([facility])       Chart routed to Care Team .

## 2020-11-03 NOTE — TELEPHONE ENCOUNTER
Next 5 appointments (look out 90 days)    Nov 10, 2020  9:00 AM  PHYSICAL with Renetta Wheeler PA-C  [facility] ([facility]) [address] [place] [id number]  [phone]        Leda Hickey MA

## 2020-11-04 LAB
INTERPRETATION ECG - MUSE: NORMAL
INTERPRETATION ECG - MUSE: NORMAL

## 2020-11-09 NOTE — PROGRESS NOTES
SUBJECTIVE:   CC: Jerrica Pappas is an 50 year old woman who presents for preventive health visit.       Patient has been advised of split billing requirements and indicates understanding: Yes  Healthy Habits:     Getting at least 3 servings of Calcium per day:  Yes    Bi-annual eye exam:  Yes    Dental care twice a year:  Yes    Sleep apnea or symptoms of sleep apnea:  None    Diet:  Regular (no restrictions)    Frequency of exercise:  2-3 days/week    Duration of exercise:  30-45 minutes    Taking medications regularly:  Yes    Medication side effects:  None    PHQ-2 Total Score: 0    Additional concerns today:  No      Hypothyroidism Follow-up      Since last visit, patient describes the following symptoms: Weight stable, no hair loss, no skin changes, no constipation, no loose stools      Constipation  - Needs Miralax every other day, fiber didn't work         Today's PHQ-2 Score:   PHQ-2 ( 1999 Pfizer) 9/24/2020   Q1: Little interest or pleasure in doing things 0   Q2: Feeling down, depressed or hopeless 0   PHQ-2 Score 0   Q1: Little interest or pleasure in doing things -   Q2: Feeling down, depressed or hopeless -   PHQ-2 Score -       Abuse: Current or Past (Physical, Sexual or Emotional) - No  Do you feel safe in your environment? Yes        Social History     Tobacco Use     Smoking status: Never Smoker     Smokeless tobacco: Never Used   Substance Use Topics     Alcohol use: No     If you drink alcohol do you typically have >3 drinks per day or >7 drinks per week? No    No flowsheet data found.    Reviewed orders with patient.  Reviewed health maintenance and updated orders accordingly - Yes    Lab work is in process  Labs reviewed in EPIC  BP Readings from Last 3 Encounters:   11/10/20 108/64   11/03/20 120/74   09/24/20 111/69    Wt Readings from Last 3 Encounters:   11/10/20 123.8 kg (273 lb)   09/24/20 124.4 kg (274 lb 4.8 oz)   09/15/20 126.6 kg (279 lb)           Mammogram Screening: Patient  under age 50, mutual decision reflected in health maintenance.      Pertinent mammograms are reviewed under the imaging tab.  History of abnormal Pap smear: NO - age 30-65 PAP every 5 years with negative HPV co-testing recommended  PAP / HPV Latest Ref Rng & Units 1/25/2018 3/3/2014 11/4/2011   PAP - NIL NIL OTHER-NIL EM>40   HPV 16 DNA NEG:Negative Negative - -   HPV 18 DNA NEG:Negative Negative - -   OTHER HR HPV NEG:Negative Negative - -     Reviewed and updated as needed this visit by clinical staff  Tobacco  Allergies  Meds   Med Hx  Surg Hx  Fam Hx  Soc Hx        Reviewed and updated as needed this visit by Provider   Allergies  Meds  Problems  Med Hx  Surg Hx            Past Medical History:   Diagnosis Date     Thyroid disorder       Past Surgical History:   Procedure Laterality Date     LAPAROTOMY MINI, TUBAL LIGATION, COMBINED       OTHER SURGICAL HISTORY      cyst on ears        Review of Systems   Constitutional: Negative for chills and fever.   HENT: Negative for congestion, ear pain, hearing loss and sore throat.    Eyes: Negative for pain and visual disturbance.   Respiratory: Positive for shortness of breath. Negative for cough.    Cardiovascular: Negative for chest pain, palpitations and peripheral edema.   Gastrointestinal: Positive for constipation. Negative for abdominal pain, diarrhea, heartburn, hematochezia and nausea.   Breasts:  Negative for tenderness, breast mass and discharge.   Genitourinary: Negative for dysuria, frequency, genital sores, hematuria, pelvic pain, urgency, vaginal bleeding and vaginal discharge.   Musculoskeletal: Negative for arthralgias, joint swelling and myalgias.   Skin: Negative for rash.   Neurological: Negative for dizziness, weakness, headaches and paresthesias.   Psychiatric/Behavioral: Negative for mood changes. The patient is not nervous/anxious.           OBJECTIVE:   /64   Pulse 80   Temp 96.6  F (35.9  C) (Temporal)   Resp 18   Ht 1.696  "m (5' 6.77\")   Wt 123.8 kg (273 lb)   LMP  (LMP Unknown)   BMI 43.05 kg/m    Physical Exam  Constitutional:       General: She is not in acute distress.     Appearance: She is well-developed.   HENT:      Right Ear: External ear normal.      Left Ear: External ear normal.      Nose: Nose normal.      Mouth/Throat:      Pharynx: No oropharyngeal exudate.   Eyes:      General:         Right eye: No discharge.         Left eye: No discharge.      Conjunctiva/sclera: Conjunctivae normal.      Pupils: Pupils are equal, round, and reactive to light.   Neck:      Musculoskeletal: Normal range of motion and neck supple.      Thyroid: No thyromegaly.      Vascular: No JVD.      Trachea: No tracheal deviation.   Cardiovascular:      Rate and Rhythm: Normal rate and regular rhythm.      Heart sounds: Normal heart sounds. No murmur. No friction rub. No gallop.    Pulmonary:      Effort: Pulmonary effort is normal. No respiratory distress.      Breath sounds: Normal breath sounds. No stridor. No wheezing or rales.   Abdominal:      General: Bowel sounds are normal. There is no distension.      Palpations: Abdomen is soft. There is no mass.      Tenderness: There is no abdominal tenderness. There is no guarding or rebound.      Hernia: No hernia is present.   Musculoskeletal: Normal range of motion.   Lymphadenopathy:      Cervical: No cervical adenopathy.   Skin:     General: Skin is warm and dry.   Neurological:      Mental Status: She is alert and oriented to person, place, and time.   Psychiatric:         Behavior: Behavior normal.         Thought Content: Thought content normal.         Judgment: Judgment normal.     Patient declined breast exam   Pelvic exam not indicated       Diagnostic Test Results:  Labs reviewed in Epic  See orders pending in Epic     ASSESSMENT/PLAN:       ICD-10-CM    1. Routine general medical examination at a health care facility  Z00.00    2. Family history of diabetes mellitus  Z83.3    3. " "Acquired hypothyroidism  E03.9    4. Screening for diabetes mellitus  Z13.1 Glucose   5. Screening for lipoid disorders  Z13.220 Lipid panel reflex to direct LDL Fasting   6. BMI >40 (H)  E66.01    7. Slow transit constipation  K59.01      - Thyroid feels stable   - Will get updated labs today  - Results will be communicated to patient when available and appropriate medication changes made if necessary     - Will get other fasting labs today as well     - Constipation    Fiber did not help, but doing Miralax every other day really helps and keeps her regular     - Has mammogram already scheduled       Patient has been advised of split billing requirements and indicates understanding: Yes  COUNSELING:  Reviewed preventive health counseling, as reflected in patient instructions  Special attention given to:        Regular exercise       Healthy diet/nutrition    Estimated body mass index is 43.05 kg/m  as calculated from the following:    Height as of this encounter: 1.696 m (5' 6.77\").    Weight as of this encounter: 123.8 kg (273 lb).  Weight management plan: Discussed healthy diet and exercise guidelines  - Encouraged her efforts   - Has slightly been limited with exercise due to shortness of breath she has had since post-COVID, but this seems to be improving   - From last year she is down 23 lbs       She reports that she has never smoked. She has never used smokeless tobacco.      Counseling Resources:  ATP IV Guidelines  Pooled Cohorts Equation Calculator  Breast Cancer Risk Calculator  BRCA-Related Cancer Risk Assessment: FHS-7 Tool  FRAX Risk Assessment  ICSI Preventive Guidelines  Dietary Guidelines for Americans, 2010  USDA's MyPlate  ASA Prophylaxis  Lung CA Screening    [facility]  "

## 2020-11-10 ENCOUNTER — OFFICE VISIT (OUTPATIENT)
Dept: FAMILY MEDICINE | Facility: OTHER | Age: 50
End: 2020-11-10
Payer: COMMERCIAL

## 2020-11-10 VITALS
DIASTOLIC BLOOD PRESSURE: 64 MMHG | RESPIRATION RATE: 18 BRPM | WEIGHT: 273 LBS | HEART RATE: 80 BPM | TEMPERATURE: 96.6 F | BODY MASS INDEX: 42.85 KG/M2 | HEIGHT: 67 IN | SYSTOLIC BLOOD PRESSURE: 108 MMHG

## 2020-11-10 DIAGNOSIS — E03.9 ACQUIRED HYPOTHYROIDISM: ICD-10-CM

## 2020-11-10 DIAGNOSIS — K59.01 SLOW TRANSIT CONSTIPATION: ICD-10-CM

## 2020-11-10 DIAGNOSIS — E66.01 MORBID OBESITY (H): ICD-10-CM

## 2020-11-10 DIAGNOSIS — Z82.79 FAMILY HISTORY OF CONGENITAL ANOMALIES: ICD-10-CM

## 2020-11-10 DIAGNOSIS — Z13.1 SCREENING FOR DIABETES MELLITUS: ICD-10-CM

## 2020-11-10 DIAGNOSIS — Z00.00 ROUTINE GENERAL MEDICAL EXAMINATION AT A HEALTH CARE FACILITY: Primary | ICD-10-CM

## 2020-11-10 DIAGNOSIS — Z83.3 FAMILY HISTORY OF DIABETES MELLITUS: ICD-10-CM

## 2020-11-10 DIAGNOSIS — Z13.220 SCREENING FOR LIPOID DISORDERS: ICD-10-CM

## 2020-11-10 DIAGNOSIS — Z82.49 FAMILY HISTORY OF CARDIAC ARREST: ICD-10-CM

## 2020-11-10 PROBLEM — K59.00 CONSTIPATION, UNSPECIFIED CONSTIPATION TYPE: Status: RESOLVED | Noted: 2020-09-15 | Resolved: 2020-11-10

## 2020-11-10 LAB
ANION GAP SERPL CALCULATED.3IONS-SCNC: 4 MMOL/L (ref 3–14)
BUN SERPL-MCNC: 8 MG/DL (ref 7–30)
CALCIUM SERPL-MCNC: 9 MG/DL (ref 8.5–10.1)
CHLORIDE SERPL-SCNC: 109 MMOL/L (ref 94–109)
CHOLEST SERPL-MCNC: 118 MG/DL
CO2 SERPL-SCNC: 27 MMOL/L (ref 20–32)
CREAT SERPL-MCNC: 0.77 MG/DL (ref 0.52–1.04)
GFR SERPL CREATININE-BSD FRML MDRD: >90 ML/MIN/{1.73_M2}
GLUCOSE SERPL-MCNC: 85 MG/DL (ref 70–99)
GLUCOSE SERPL-MCNC: 88 MG/DL (ref 70–99)
HDLC SERPL-MCNC: 47 MG/DL
LDLC SERPL CALC-MCNC: 63 MG/DL
NONHDLC SERPL-MCNC: 71 MG/DL
POTASSIUM SERPL-SCNC: 4.3 MMOL/L (ref 3.4–5.3)
SODIUM SERPL-SCNC: 140 MMOL/L (ref 133–144)
TRIGL SERPL-MCNC: 41 MG/DL
TSH SERPL DL<=0.005 MIU/L-ACNC: 1.34 MU/L (ref 0.4–4)

## 2020-11-10 PROCEDURE — 80061 LIPID PANEL: CPT | Performed by: PHYSICIAN ASSISTANT

## 2020-11-10 PROCEDURE — 84443 ASSAY THYROID STIM HORMONE: CPT | Performed by: PHYSICIAN ASSISTANT

## 2020-11-10 PROCEDURE — 36415 COLL VENOUS BLD VENIPUNCTURE: CPT | Performed by: PHYSICIAN ASSISTANT

## 2020-11-10 PROCEDURE — 99396 PREV VISIT EST AGE 40-64: CPT | Performed by: PHYSICIAN ASSISTANT

## 2020-11-10 PROCEDURE — 82947 ASSAY GLUCOSE BLOOD QUANT: CPT | Mod: 59 | Performed by: PHYSICIAN ASSISTANT

## 2020-11-10 PROCEDURE — 80048 BASIC METABOLIC PNL TOTAL CA: CPT | Performed by: PHYSICIAN ASSISTANT

## 2020-11-10 ASSESSMENT — ENCOUNTER SYMPTOMS
BREAST MASS: 0
SORE THROAT: 0
EYE PAIN: 0
ARTHRALGIAS: 0
HEARTBURN: 0
MYALGIAS: 0
ABDOMINAL PAIN: 0
FREQUENCY: 0
HEMATOCHEZIA: 0
CONSTIPATION: 1
PARESTHESIAS: 0
SHORTNESS OF BREATH: 1
HEMATURIA: 0
COUGH: 0
DIZZINESS: 0
HEADACHES: 0
DYSURIA: 0
PALPITATIONS: 0
NAUSEA: 0
CHILLS: 0
JOINT SWELLING: 0
DIARRHEA: 0
WEAKNESS: 0
FEVER: 0
NERVOUS/ANXIOUS: 0

## 2020-11-10 ASSESSMENT — MIFFLIN-ST. JEOR: SCORE: 1892.33

## 2020-11-10 ASSESSMENT — PAIN SCALES - GENERAL: PAINLEVEL: NO PAIN (0)

## 2020-12-01 ENCOUNTER — ANCILLARY PROCEDURE (OUTPATIENT)
Dept: MAMMOGRAPHY | Facility: OTHER | Age: 50
End: 2020-12-01
Payer: COMMERCIAL

## 2020-12-01 DIAGNOSIS — Z12.39 SCREENING FOR MALIGNANT NEOPLASM OF BREAST: ICD-10-CM

## 2020-12-01 PROCEDURE — 77067 SCR MAMMO BI INCL CAD: CPT | Performed by: RADIOLOGY

## 2021-03-15 NOTE — NURSING NOTE
"Chief Complaint   Patient presents with     Abnormal Uterine Bleeding     remove  Nexplanon       Initial /76 (BP Location: Right arm, Patient Position: Sitting, Cuff Size: Adult Large)  Pulse 72  Wt 273 lb 12 oz (124.2 kg)  BMI 43.12 kg/m2 Estimated body mass index is 43.12 kg/(m^2) as calculated from the following:    Height as of 1/25/18: 5' 6.81\" (1.697 m).    Weight as of this encounter: 273 lb 12 oz (124.2 kg).  Medication Reconciliation: complete    Stephanie Gill CMA    " "Discharge Planning Assessment   [place]     Patient Name: Pamela Bateman  MRN: [id number]  Today's Date: 3/15/2021    Admit Date: 3/14/2021    Discharge Needs Assessment     Row Name 03/15/21 1513       Living Environment    Lives With  parent(s)    Current Living Arrangements  home/apartment/condo    Primary Care Provided by  self    Provides Primary Care For  parent(s)    Caregiving Concerns  Primary caregiver for mother \"who is dying of cancer.\"    Able to Return to Prior Arrangements  yes       Resource/Environmental Concerns    Resource/Environmental Concerns  none       Transition Planning    Patient/Family Anticipates Transition to  home with family    Patient/Family Anticipated Services at Transition  none    Transportation Anticipated  car, drives self       Discharge Needs Assessment    Readmission Within the Last 30 Days  no previous admission in last 30 days    Equipment Currently Used at Home  none    Concerns to be Addressed  medication;financial/insurance    Equipment Needed After Discharge  none        Discharge Plan     Row Name 03/15/21 1514       Plan    Plan  DC Plan: Anticipate Routine Home- Likely to need medication assistance at DC    Patient/Family in Agreement with Plan  yes    Plan Comments  S/w pt who states she is independent. She drives and uses no DME. Does not currently have PCP and has [place] Medicaid insurance. Pt has recently moved to [place] to care for ailing mother and has not yet been able to change insurance provider to [place] Medicaid. RN contacted [facility] with questions regarding this. CM notified SW, who included [facility]. Informed that pt will need to cancel her [place] insurance and provide confirmation of this before she is able to apply for [place] Medicaid. CM notified pt of this, she states that d/t her recent move and quarantine she has not been able to update her license, which she assumed [place] to [place] prior to [place]. PT states that without her insurance, she " is unable to fill her prescriptions. Will follow for medication assistance at [place].        Phone communication or documentation only - no physical contact with patient or family.      Latia Burgos RN

## 2021-03-21 ENCOUNTER — RECORDS - HEALTHEAST (OUTPATIENT)
Dept: LAB | Facility: CLINIC | Age: 51
End: 2021-03-21

## 2021-03-21 LAB
SARS-COV-2 PCR COMMENT: NORMAL
SARS-COV-2 RNA SPEC QL NAA+PROBE: NEGATIVE
SARS-COV-2 VIRUS SPECIMEN SOURCE: NORMAL

## 2021-03-24 DIAGNOSIS — E66.01 MORBID OBESITY (H): Primary | ICD-10-CM

## 2021-08-01 DIAGNOSIS — E03.9 ACQUIRED HYPOTHYROIDISM: ICD-10-CM

## 2021-08-03 RX ORDER — LEVOTHYROXINE SODIUM 112 UG/1
TABLET ORAL
Qty: 90 TABLET | Refills: 0 | Status: SHIPPED | OUTPATIENT
Start: 2021-08-03 | End: 2021-11-11

## 2021-08-03 NOTE — TELEPHONE ENCOUNTER
Pending Prescriptions:                       Disp   Refills    levothyroxine (SYNTHROID/LEVOTHROID) 112 *90 tab*0            Sig: TAKE ONE TABLET BY MOUTH ONE TIME DAILY     Medication is being filled for 1 time veronica refill only due to:  Patient is due for med check with labs in Nov    Please call and help schedule.  Thank you!

## 2021-08-12 ENCOUNTER — TELEPHONE (OUTPATIENT)
Dept: FAMILY MEDICINE | Facility: OTHER | Age: 51
End: 2021-08-12

## 2021-08-12 DIAGNOSIS — Z12.11 SCREENING FOR COLON CANCER: Primary | ICD-10-CM

## 2021-08-12 NOTE — TELEPHONE ENCOUNTER
Patient Quality Outreach Summary      Summary:    Patient is due/failing the following:   Colonoscopy    Type of outreach:    Phone, spoke to patient/parent. patient is ready to schedule her colonoscopy     Questions for provider review:    Please place colonoscopy order for patient to complete at [facility].                                                                                                                 Annette Garrett CMA       Chart routed to PCP

## 2021-08-13 ENCOUNTER — TELEPHONE (OUTPATIENT)
Dept: FAMILY MEDICINE | Facility: OTHER | Age: 51
End: 2021-08-13

## 2021-08-13 NOTE — TELEPHONE ENCOUNTER
Called pharmacy and gave verbal of Rx that was sent on 8/3/21.    LEXIE Monreal/[facility]     Abdominal Pain    Many things can cause abdominal pain. Many times, abdominal pain is not caused by a disease and will improve without treatment. Your health care provider will do a physical exam to determine if there is a dangerous cause of your pain; blood tests and imaging may help determine the cause of your pain. However, in many cases, no cause may be found and you may need further testing as an outpatient. Monitor your abdominal pain for any changes.     SEEK IMMEDIATE MEDICAL CARE IF YOU HAVE ANY OF THE FOLLOWING SYMPTOMS: worsening abdominal pain, uncontrollable vomiting, profuse diarrhea, inability to have bowel movements or pass gas, black or bloody stools, fever accompanying chest pain or back pain, or fainting. These symptoms may represent a serious problem that is an emergency. Do not wait to see if the symptoms will go away. Get medical help right away. Call 911 and do not drive yourself to the hospital.

## 2021-08-13 NOTE — TELEPHONE ENCOUNTER
[facility]: We never received a new Rx for levothyroxine (SYNTHROID/LEVOTHROID) 112 MCG tablet. Requested 8/03/21  Please resend

## 2021-08-31 ENCOUNTER — TELEPHONE (OUTPATIENT)
Dept: GASTROENTEROLOGY | Facility: OUTPATIENT CENTER | Age: 51
End: 2021-08-31

## 2021-08-31 NOTE — TELEPHONE ENCOUNTER
Screening Questions    Is patient active on mychart?  NO, MAILED PREP INFO    1. What insurance is in the chart? [employer]    2. Ordering/Referring Provider: PCP    3. BMI 41.0    4. Are you on daily home oxygen? NO    5. Do you have a history of difficult airway? NO    6. Have you had a heart, lung or liver transplant? NO    7. Are you currently on dialysis? NO    8. Have you had a stroke or Transient ischemic atttack (TIA) within 6 months? NO    9. In the past 6 months, have you had any heart related issues including cardiomyopathy or heart attack?  NO       If yes, did it require cardiac stenting or other implantable device?     10. Do you have any implantable devices in your body (pacemaker, defib, LVAD)? NO    11. Do you take nitroglycerin? If yes, how often? NO    12. Are you currently taking any blood thinners? NO    13. Are you a diabetic? NO    14. (Females) Are you currently pregnant? NO   If yes, how many weeks?    15. Have you had a procedure in the past that was difficult to tolerate with conscious sedation? Any allergies to Fentanyl or Versed?  NO    16. Are you taking any scheduled prescription narcotics more than once daily? NO    17. Do you have any chemical dependencies such as alcohol, street drugs, or methadone? NO    18. Do you have any history of post-traumatic stress syndrome or mental health issues? NO    19. Do you transfer independently? YES    20.  Do you have any issues with constipation? NO    21. Preferred pharmacy for prep prescription: X    Scheduling Details    Which Colonoscopy prep was sent?  MIRALAX  Procedure Scheduled:  COLONOSCOPY  Provider/Surgeon:  QUINTIN  Date of Procedure:  9-30  Location:    Caller (Please ask for phone number if not scheduled by patient):  SELF      Sedation Type: MAC      Pre-op Required at [facility] [place] [place] and OR for MAC sedation: YES  If yes, advise patient they will need a pre-op prior to procedure.     Is patient on blood thinners?  NO     If yes, inform patient to follow up with PCP or provider for follow up instructions.        Informed patient they will need an adult ?  YES  Cannot take any type of public or medical transportation alone    Informed Patient of COVID Test Requirement?  YES    Confirmed Nurse will call to complete assessment?  YES      Additional comments: NO

## 2021-09-02 DIAGNOSIS — Z11.59 ENCOUNTER FOR SCREENING FOR OTHER VIRAL DISEASES: ICD-10-CM

## 2021-10-04 NOTE — PROGRESS NOTES
[address]  [facility] [address]  Phone: [phone]  Primary Provider: Renetta Wheeler  Pre-op Performing Provider: DEVON FIGUEREDO      PREOPERATIVE EVALUATION:  Today's date: 10/5/2021    Jerrica Pappas is a 51 year old female who presents for a preoperative evaluation.    Surgical Information:  Surgery/Procedure: COLONOSCOPY, WITH CO2 INSUFFLATION ESOPHAGOGASTRODUODENOSCOPY, WITH CO2 INSUFFLATION  Surgery Location: [facility]   Surgeon: Dr. Perez  Surgery Date: 10/14  Time of Surgery: 12:10PM  Where patient plans to recover: At home with family  Fax number for surgical facility: Note does not need to be faxed, will be available electronically in Epic.    Type of Anesthesia Anticipated: MAC    Assessment & Plan     The proposed surgical procedure is considered LOW risk.    Preop general physical exam    Screen for colon cancer    Advanced care planning/counseling discussion    Acquired hypothyroidism    Influenza vaccination declined by patient           Risks and Recommendations:  The patient has the following additional risks and recommendations for perioperative complications:   - No identified additional risk factors other than previously addressed    Medication Instructions:  Patient is to take all scheduled medications on the day of surgery    RECOMMENDATION:  APPROVAL GIVEN to proceed with proposed procedure, without further diagnostic evaluation.    Subjective     HPI related to upcoming procedure: Due for screening colonoscopy.       Preop Questions 10/5/2021   1. Have you ever had a heart attack or stroke? No   2. Have you ever had surgery on your heart or blood vessels, such as a stent placement, a coronary artery bypass, or surgery on an artery in your head, neck, heart, or legs? No   3. Do you have chest pain with activity? No   4. Do you have a history of  heart failure? No   5. Do you currently have a cold, bronchitis or  symptoms of other infection? No   6. Do you have a cough, shortness of breath, or wheezing? No   7. Do you or anyone in your family have previous history of blood clots? No   8. Do you or does anyone in your family have a serious bleeding problem such as prolonged bleeding following surgeries or cuts? No   9. Have you ever had problems with anemia or been told to take iron pills? YES - only during pregnancy.   10. Have you had any abnormal blood loss such as black, tarry or bloody stools, or abnormal vaginal bleeding? No   11. Have you ever had a blood transfusion? No   12. Are you willing to have a blood transfusion if it is medically needed before, during, or after your surgery? Yes   13. Have you or any of your relatives ever had problems with anesthesia? No   14. Do you have sleep apnea, excessive snoring or daytime drowsiness? No   15. Do you have any artifical heart valves or other implanted medical devices like a pacemaker, defibrillator, or continuous glucose monitor? No   16. Do you have artificial joints? No   17. Are you allergic to latex? No   18. Is there any chance that you may be pregnant? No     Health Care Directive:  Patient does not have a Health Care Directive or Living Will: Discussed advance care planning with patient; information given to patient to review.    Preoperative Review of :   reviewed - no record of controlled substances prescribed.      Status of Chronic Conditions:  HYPOTHYROIDISM - Patient has a longstanding history of chronic Hypothyroidism. Patient has been doing well, noting no tremor, insomnia, hair loss or changes in skin texture. Continues to take medications as directed, without adverse reactions or side effects. Last TSH   Lab Results   Component Value Date    TSH 1.34 11/10/2020   .        Review of Systems  Constitutional, neuro, ENT, endocrine, pulmonary, cardiac, gastrointestinal, genitourinary, musculoskeletal, integument and psychiatric systems are negative,  except as otherwise noted.    Patient Active Problem List    Diagnosis Date Noted     Internal hemorrhoid 09/15/2020     Priority: Medium     Slow transit constipation 09/15/2020     Priority: Medium     Family history of diabetes mellitus 01/25/2018     Priority: Medium     Plantar fasciitis of right foot 12/14/2016     Priority: Medium     Pes planus of both feet 08/11/2016     Priority: Medium     Plantar fasciitis 08/11/2016     Priority: Medium     BMI >40 (H) 08/11/2016     Priority: Medium     Nexplanon in place 09/28/2015     Priority: Medium     Placed 9/28/15.  See scan       Influenza vaccination declined 02/09/2015     Priority: Medium     Pt declined 02/09/2015       Acquired hypothyroidism 03/03/2014     Priority: Medium      Past Medical History:   Diagnosis Date     Thyroid disorder      Past Surgical History:   Procedure Laterality Date     LAPAROTOMY MINI, TUBAL LIGATION, COMBINED       OTHER SURGICAL HISTORY      cyst on ears      Current Outpatient Medications   Medication Sig Dispense Refill     levothyroxine (SYNTHROID/LEVOTHROID) 112 MCG tablet TAKE ONE TABLET BY MOUTH ONE TIME DAILY  90 tablet 0     polyethylene glycol (MIRALAX) 17 g packet Take 1 packet by mouth daily       STATIN NOT PRESCRIBED (INTENTIONAL) Please choose reason not prescribed from choices below.       aspirin (ASA) 81 MG chewable tablet Take 81 mg by mouth daily (Patient not taking: Reported on 10/5/2021)         Allergies   Allergen Reactions     Chloroquine         Social History     Tobacco Use     Smoking status: Never Smoker     Smokeless tobacco: Never Used   Substance Use Topics     Alcohol use: No     Family History   Problem Relation Age of Onset     No Known Problems Mother      Hypertension Father      Diabetes Type 2  Sister      Schizophrenia Brother         ?schizophrenia, better on meds     Arrhythmia Son         sudden cardiac arrest     Hypertension Sister      Diabetes Brother      Diabetes Brother   "    History   Drug Use No         Objective     /70   Pulse 77   Temp 97  F (36.1  C) (Temporal)   Resp 14   Ht 1.702 m (5' 7\")   Wt 124.7 kg (275 lb)   SpO2 99%   BMI 43.07 kg/m      Physical Exam    GENERAL APPEARANCE: healthy, alert and no distress     EYES: EOMI, PERRL     HENT: ear canals and TM's normal and nose and mouth without ulcers or lesions     NECK: no adenopathy, no asymmetry, masses, or scars and thyroid normal to palpation     RESP: lungs clear to auscultation - no rales, rhonchi or wheezes     CV: regular rates and rhythm, normal S1 S2, no S3 or S4 and no murmur, click or rub     ABDOMEN:  soft, nontender, no HSM or masses and bowel sounds normal     MS: extremities normal- no gross deformities noted, no evidence of inflammation in joints, FROM in all extremities.     SKIN: no suspicious lesions or rashes     NEURO: Normal strength and tone, sensory exam grossly normal, mentation intact and speech normal     PSYCH: mentation appears normal. and affect normal/bright     LYMPHATICS: No cervical adenopathy    Recent Labs   Lab Test 11/10/20  0841      POTASSIUM 4.3   CR 0.77        Diagnostics:  No labs were ordered during this visit.   No EKG required for low risk surgery (cataract, skin procedure, breast biopsy, etc).    Revised Cardiac Risk Index (RCRI):  The patient has the following serious cardiovascular risks for perioperative complications:   - No serious cardiac risks = 0 points     RCRI Interpretation: 0 points: Class I (very low risk - 0.4% complication rate)           Signed Electronically by: Deep Miller PA-C  Copy of this evaluation report is provided to requesting physician.        "

## 2021-10-04 NOTE — H&P (VIEW-ONLY)
[address]  [facility] [address]  Phone: [phone]  Primary Provider: Renetta Wheeler  Pre-op Performing Provider: DEVON FIGUEREDO      PREOPERATIVE EVALUATION:  Today's date: 10/5/2021    Jerrica Pappas is a 51 year old female who presents for a preoperative evaluation.    Surgical Information:  Surgery/Procedure: COLONOSCOPY, WITH CO2 INSUFFLATION ESOPHAGOGASTRODUODENOSCOPY, WITH CO2 INSUFFLATION  Surgery Location: [place]   Surgeon: Dr. Perez  Surgery Date: 10/14  Time of Surgery: 12:10PM  Where patient plans to recover: At home with family  Fax number for surgical facility: Note does not need to be faxed, will be available electronically in Epic.    Type of Anesthesia Anticipated: MAC    Assessment & Plan     The proposed surgical procedure is considered LOW risk.    Preop general physical exam    Screen for colon cancer    Advanced care planning/counseling discussion    Acquired hypothyroidism    Influenza vaccination declined by patient           Risks and Recommendations:  The patient has the following additional risks and recommendations for perioperative complications:   - No identified additional risk factors other than previously addressed    Medication Instructions:  Patient is to take all scheduled medications on the day of surgery    RECOMMENDATION:  APPROVAL GIVEN to proceed with proposed procedure, without further diagnostic evaluation.    Subjective     HPI related to upcoming procedure: Due for screening colonoscopy.       Preop Questions 10/5/2021   1. Have you ever had a heart attack or stroke? No   2. Have you ever had surgery on your heart or blood vessels, such as a stent placement, a coronary artery bypass, or surgery on an artery in your head, neck, heart, or legs? No   3. Do you have chest pain with activity? No   4. Do you have a history of  heart failure? No   5. Do you currently have a cold, bronchitis or  symptoms of other infection? No   6. Do you have a cough, shortness of breath, or wheezing? No   7. Do you or anyone in your family have previous history of blood clots? No   8. Do you or does anyone in your family have a serious bleeding problem such as prolonged bleeding following surgeries or cuts? No   9. Have you ever had problems with anemia or been told to take iron pills? YES - only during pregnancy.   10. Have you had any abnormal blood loss such as black, tarry or bloody stools, or abnormal vaginal bleeding? No   11. Have you ever had a blood transfusion? No   12. Are you willing to have a blood transfusion if it is medically needed before, during, or after your surgery? Yes   13. Have you or any of your relatives ever had problems with anesthesia? No   14. Do you have sleep apnea, excessive snoring or daytime drowsiness? No   15. Do you have any artifical heart valves or other implanted medical devices like a pacemaker, defibrillator, or continuous glucose monitor? No   16. Do you have artificial joints? No   17. Are you allergic to latex? No   18. Is there any chance that you may be pregnant? No     Health Care Directive:  Patient does not have a Health Care Directive or Living Will: Discussed advance care planning with patient; information given to patient to review.    Preoperative Review of :   reviewed - no record of controlled substances prescribed.      Status of Chronic Conditions:  HYPOTHYROIDISM - Patient has a longstanding history of chronic Hypothyroidism. Patient has been doing well, noting no tremor, insomnia, hair loss or changes in skin texture. Continues to take medications as directed, without adverse reactions or side effects. Last TSH   Lab Results   Component Value Date    TSH 1.34 11/10/2020   .        Review of Systems  Constitutional, neuro, ENT, endocrine, pulmonary, cardiac, gastrointestinal, genitourinary, musculoskeletal, integument and psychiatric systems are negative,  except as otherwise noted.    Patient Active Problem List    Diagnosis Date Noted     Internal hemorrhoid 09/15/2020     Priority: Medium     Slow transit constipation 09/15/2020     Priority: Medium     Family history of diabetes mellitus 01/25/2018     Priority: Medium     Plantar fasciitis of right foot 12/14/2016     Priority: Medium     Pes planus of both feet 08/11/2016     Priority: Medium     Plantar fasciitis 08/11/2016     Priority: Medium     BMI >40 (H) 08/11/2016     Priority: Medium     Nexplanon in place 09/28/2015     Priority: Medium     Placed 9/28/15.  See scan       Influenza vaccination declined 02/09/2015     Priority: Medium     Pt declined 02/09/2015       Acquired hypothyroidism 03/03/2014     Priority: Medium      Past Medical History:   Diagnosis Date     Thyroid disorder      Past Surgical History:   Procedure Laterality Date     LAPAROTOMY MINI, TUBAL LIGATION, COMBINED       OTHER SURGICAL HISTORY      cyst on ears      Current Outpatient Medications   Medication Sig Dispense Refill     levothyroxine (SYNTHROID/LEVOTHROID) 112 MCG tablet TAKE ONE TABLET BY MOUTH ONE TIME DAILY  90 tablet 0     polyethylene glycol (MIRALAX) 17 g packet Take 1 packet by mouth daily       STATIN NOT PRESCRIBED (INTENTIONAL) Please choose reason not prescribed from choices below.       aspirin (ASA) 81 MG chewable tablet Take 81 mg by mouth daily (Patient not taking: Reported on 10/5/2021)         Allergies   Allergen Reactions     Chloroquine         Social History     Tobacco Use     Smoking status: Never Smoker     Smokeless tobacco: Never Used   Substance Use Topics     Alcohol use: No     Family History   Problem Relation Age of Onset     No Known Problems Mother      Hypertension Father      Diabetes Type 2  Sister      Schizophrenia Brother         ?schizophrenia, better on meds     Arrhythmia Son         sudden cardiac arrest     Hypertension Sister      Diabetes Brother      Diabetes Brother   "    History   Drug Use No         Objective     /70   Pulse 77   Temp 97  F (36.1  C) (Temporal)   Resp 14   Ht 1.702 m (5' 7\")   Wt 124.7 kg (275 lb)   SpO2 99%   BMI 43.07 kg/m      Physical Exam    GENERAL APPEARANCE: healthy, alert and no distress     EYES: EOMI, PERRL     HENT: ear canals and TM's normal and nose and mouth without ulcers or lesions     NECK: no adenopathy, no asymmetry, masses, or scars and thyroid normal to palpation     RESP: lungs clear to auscultation - no rales, rhonchi or wheezes     CV: regular rates and rhythm, normal S1 S2, no S3 or S4 and no murmur, click or rub     ABDOMEN:  soft, nontender, no HSM or masses and bowel sounds normal     MS: extremities normal- no gross deformities noted, no evidence of inflammation in joints, FROM in all extremities.     SKIN: no suspicious lesions or rashes     NEURO: Normal strength and tone, sensory exam grossly normal, mentation intact and speech normal     PSYCH: mentation appears normal. and affect normal/bright     LYMPHATICS: No cervical adenopathy    Recent Labs   Lab Test 11/10/20  0841      POTASSIUM 4.3   CR 0.77        Diagnostics:  No labs were ordered during this visit.   No EKG required for low risk surgery (cataract, skin procedure, breast biopsy, etc).    Revised Cardiac Risk Index (RCRI):  The patient has the following serious cardiovascular risks for perioperative complications:   - No serious cardiac risks = 0 points     RCRI Interpretation: 0 points: Class I (very low risk - 0.4% complication rate)           Signed Electronically by: Deep Miller PA-C  Copy of this evaluation report is provided to requesting physician.        "

## 2021-10-05 ENCOUNTER — OFFICE VISIT (OUTPATIENT)
Dept: FAMILY MEDICINE | Facility: OTHER | Age: 51
End: 2021-10-05
Payer: COMMERCIAL

## 2021-10-05 VITALS
SYSTOLIC BLOOD PRESSURE: 112 MMHG | HEIGHT: 67 IN | HEART RATE: 77 BPM | DIASTOLIC BLOOD PRESSURE: 70 MMHG | WEIGHT: 275 LBS | BODY MASS INDEX: 43.16 KG/M2 | OXYGEN SATURATION: 99 % | TEMPERATURE: 97 F | RESPIRATION RATE: 14 BRPM

## 2021-10-05 DIAGNOSIS — Z01.818 PREOP GENERAL PHYSICAL EXAM: Primary | ICD-10-CM

## 2021-10-05 DIAGNOSIS — Z71.89 ADVANCED CARE PLANNING/COUNSELING DISCUSSION: ICD-10-CM

## 2021-10-05 DIAGNOSIS — Z12.11 SCREEN FOR COLON CANCER: ICD-10-CM

## 2021-10-05 DIAGNOSIS — Z28.21 INFLUENZA VACCINATION DECLINED BY PATIENT: ICD-10-CM

## 2021-10-05 DIAGNOSIS — E03.9 ACQUIRED HYPOTHYROIDISM: ICD-10-CM

## 2021-10-05 PROCEDURE — 99213 OFFICE O/P EST LOW 20 MIN: CPT | Performed by: PHYSICIAN ASSISTANT

## 2021-10-05 ASSESSMENT — MIFFLIN-ST. JEOR: SCORE: 1900.02

## 2021-10-05 NOTE — PATIENT INSTRUCTIONS

## 2021-10-10 ENCOUNTER — LAB (OUTPATIENT)
Dept: URGENT CARE | Facility: URGENT CARE | Age: 51
End: 2021-10-10
Attending: INTERNAL MEDICINE
Payer: COMMERCIAL

## 2021-10-10 DIAGNOSIS — Z11.59 ENCOUNTER FOR SCREENING FOR OTHER VIRAL DISEASES: ICD-10-CM

## 2021-10-10 PROCEDURE — U0005 INFEC AGEN DETEC AMPLI PROBE: HCPCS

## 2021-10-10 PROCEDURE — U0003 INFECTIOUS AGENT DETECTION BY NUCLEIC ACID (DNA OR RNA); SEVERE ACUTE RESPIRATORY SYNDROME CORONAVIRUS 2 (SARS-COV-2) (CORONAVIRUS DISEASE [COVID-19]), AMPLIFIED PROBE TECHNIQUE, MAKING USE OF HIGH THROUGHPUT TECHNOLOGIES AS DESCRIBED BY CMS-2020-01-R: HCPCS

## 2021-10-11 LAB — SARS-COV-2 RNA RESP QL NAA+PROBE: NEGATIVE

## 2021-10-13 ENCOUNTER — ANESTHESIA EVENT (OUTPATIENT)
Dept: SURGERY | Facility: AMBULATORY SURGERY CENTER | Age: 51
End: 2021-10-13
Payer: COMMERCIAL

## 2021-10-14 ENCOUNTER — HOSPITAL ENCOUNTER (OUTPATIENT)
Facility: AMBULATORY SURGERY CENTER | Age: 51
End: 2021-10-14
Attending: INTERNAL MEDICINE
Payer: COMMERCIAL

## 2021-10-14 ENCOUNTER — ANESTHESIA (OUTPATIENT)
Dept: SURGERY | Facility: AMBULATORY SURGERY CENTER | Age: 51
End: 2021-10-14
Payer: COMMERCIAL

## 2021-10-14 VITALS
DIASTOLIC BLOOD PRESSURE: 94 MMHG | RESPIRATION RATE: 14 BRPM | TEMPERATURE: 97.3 F | SYSTOLIC BLOOD PRESSURE: 129 MMHG | OXYGEN SATURATION: 98 %

## 2021-10-14 VITALS — HEART RATE: 89 BPM

## 2021-10-14 LAB — COLONOSCOPY: NORMAL

## 2021-10-14 PROCEDURE — G8918 PT W/O PREOP ORDER IV AB PRO: HCPCS

## 2021-10-14 PROCEDURE — G8907 PT DOC NO EVENTS ON DISCHARG: HCPCS

## 2021-10-14 PROCEDURE — 45385 COLONOSCOPY W/LESION REMOVAL: CPT

## 2021-10-14 RX ORDER — ONDANSETRON 4 MG/1
4 TABLET, ORALLY DISINTEGRATING ORAL EVERY 30 MIN PRN
Status: DISCONTINUED | OUTPATIENT
Start: 2021-10-14 | End: 2021-10-15 | Stop reason: HOSPADM

## 2021-10-14 RX ORDER — NALOXONE HYDROCHLORIDE 0.4 MG/ML
0.2 INJECTION, SOLUTION INTRAMUSCULAR; INTRAVENOUS; SUBCUTANEOUS
Status: DISCONTINUED | OUTPATIENT
Start: 2021-10-14 | End: 2021-10-15 | Stop reason: HOSPADM

## 2021-10-14 RX ORDER — PROPOFOL 10 MG/ML
INJECTION, EMULSION INTRAVENOUS PRN
Status: DISCONTINUED | OUTPATIENT
Start: 2021-10-14 | End: 2021-10-14

## 2021-10-14 RX ORDER — SODIUM CHLORIDE, SODIUM LACTATE, POTASSIUM CHLORIDE, CALCIUM CHLORIDE 600; 310; 30; 20 MG/100ML; MG/100ML; MG/100ML; MG/100ML
INJECTION, SOLUTION INTRAVENOUS CONTINUOUS
Status: DISCONTINUED | OUTPATIENT
Start: 2021-10-14 | End: 2021-10-15 | Stop reason: HOSPADM

## 2021-10-14 RX ORDER — ONDANSETRON 4 MG/1
4 TABLET, ORALLY DISINTEGRATING ORAL EVERY 6 HOURS PRN
Status: DISCONTINUED | OUTPATIENT
Start: 2021-10-14 | End: 2021-10-15 | Stop reason: HOSPADM

## 2021-10-14 RX ORDER — PROCHLORPERAZINE MALEATE 10 MG
10 TABLET ORAL EVERY 6 HOURS PRN
Status: DISCONTINUED | OUTPATIENT
Start: 2021-10-14 | End: 2021-10-15 | Stop reason: HOSPADM

## 2021-10-14 RX ORDER — ONDANSETRON 2 MG/ML
4 INJECTION INTRAMUSCULAR; INTRAVENOUS EVERY 30 MIN PRN
Status: DISCONTINUED | OUTPATIENT
Start: 2021-10-14 | End: 2021-10-15 | Stop reason: HOSPADM

## 2021-10-14 RX ORDER — LIDOCAINE 40 MG/G
CREAM TOPICAL
Status: DISCONTINUED | OUTPATIENT
Start: 2021-10-14 | End: 2021-10-15 | Stop reason: HOSPADM

## 2021-10-14 RX ORDER — FLUMAZENIL 0.1 MG/ML
0.2 INJECTION, SOLUTION INTRAVENOUS
Status: ACTIVE | OUTPATIENT
Start: 2021-10-14 | End: 2021-10-14

## 2021-10-14 RX ORDER — LIDOCAINE HYDROCHLORIDE 20 MG/ML
INJECTION, SOLUTION INFILTRATION; PERINEURAL PRN
Status: DISCONTINUED | OUTPATIENT
Start: 2021-10-14 | End: 2021-10-14

## 2021-10-14 RX ORDER — MEPERIDINE HYDROCHLORIDE 25 MG/ML
12.5 INJECTION INTRAMUSCULAR; INTRAVENOUS; SUBCUTANEOUS
Status: DISCONTINUED | OUTPATIENT
Start: 2021-10-14 | End: 2021-10-15 | Stop reason: HOSPADM

## 2021-10-14 RX ORDER — METOPROLOL TARTRATE 1 MG/ML
1-2 INJECTION, SOLUTION INTRAVENOUS EVERY 5 MIN PRN
Status: DISCONTINUED | OUTPATIENT
Start: 2021-10-14 | End: 2021-10-15 | Stop reason: HOSPADM

## 2021-10-14 RX ORDER — NALOXONE HYDROCHLORIDE 0.4 MG/ML
0.4 INJECTION, SOLUTION INTRAMUSCULAR; INTRAVENOUS; SUBCUTANEOUS
Status: DISCONTINUED | OUTPATIENT
Start: 2021-10-14 | End: 2021-10-15 | Stop reason: HOSPADM

## 2021-10-14 RX ORDER — ONDANSETRON 2 MG/ML
4 INJECTION INTRAMUSCULAR; INTRAVENOUS EVERY 6 HOURS PRN
Status: DISCONTINUED | OUTPATIENT
Start: 2021-10-14 | End: 2021-10-15 | Stop reason: HOSPADM

## 2021-10-14 RX ORDER — ONDANSETRON 2 MG/ML
4 INJECTION INTRAMUSCULAR; INTRAVENOUS
Status: DISCONTINUED | OUTPATIENT
Start: 2021-10-14 | End: 2021-10-15 | Stop reason: HOSPADM

## 2021-10-14 RX ADMIN — PROPOFOL 50 MG: 10 INJECTION, EMULSION INTRAVENOUS at 08:44

## 2021-10-14 RX ADMIN — PROPOFOL 50 MG: 10 INJECTION, EMULSION INTRAVENOUS at 08:40

## 2021-10-14 RX ADMIN — PROPOFOL 50 MG: 10 INJECTION, EMULSION INTRAVENOUS at 08:37

## 2021-10-14 RX ADMIN — PROPOFOL 50 MG: 10 INJECTION, EMULSION INTRAVENOUS at 08:34

## 2021-10-14 RX ADMIN — LIDOCAINE HYDROCHLORIDE 40 MG: 20 INJECTION, SOLUTION INFILTRATION; PERINEURAL at 08:22

## 2021-10-14 RX ADMIN — PROPOFOL 100 MG: 10 INJECTION, EMULSION INTRAVENOUS at 08:23

## 2021-10-14 RX ADMIN — SODIUM CHLORIDE, SODIUM LACTATE, POTASSIUM CHLORIDE, CALCIUM CHLORIDE: 600; 310; 30; 20 INJECTION, SOLUTION INTRAVENOUS at 07:20

## 2021-10-14 RX ADMIN — PROPOFOL 50 MG: 10 INJECTION, EMULSION INTRAVENOUS at 08:29

## 2021-10-14 ASSESSMENT — LIFESTYLE VARIABLES: TOBACCO_USE: 0

## 2021-10-14 NOTE — LETTER
"October 18, 2021      Jerrica Pappas  [address] [place] [address]        Dear ,    We are writing to inform you of your test results.  I am writing to let you know the results of the polyp that was removed at the time of your colonoscopy.  The polyp returned as a \"hyperplastic polyp\".  A hyperplastic polyp is a benign polyp and has no risk of cancer.  Your polyp was completely removed.      Based on current recommendations, I recommend that you repeat a colonoscopy in ten (10) years.  Of course should you develop any symptoms (such as unintended weight loss, blood in your stool or change in bowel pattern), you should let myself or your primary care doctor know as you may need an earlier procedure.      If you have any questions, please don't hesitate to contact the GI care coordinator at [phone].    Carlos Manuel Perez MD  [facility]  Gastroenterology and Hepatology  [facility]   Resulted Orders   Surgical Pathology Exam   Result Value Ref Range    Case Report       Surgical Pathology Report                         Case: [id number]                                  Authorizing Provider:  Carlos Manuel Perez      Collected:           10/14/2021 08:39 AM                                 MD Yuval                                                                     Ordering Location:     [facility]    Received:            10/14/2021 12:46 PM                                 [place] [facility]                                                                     Pathologist:           Iman Chen MD                                                             Specimens:   A) - Large Intestine, Colon, Ascending, right colon polyp                                           B) - Large Intestine, Colon, Sigmoid/Rectal, Sigmoid colon polyp   " "                        Final Diagnosis       A. Ascending colon, polyp:  - Colonic mucosa with no significant histologic abnormality; no neoplastic or hyperplastic polyp identified      B. Large intestine, Sigmoid colon, polyp:  - Hyperplastic polyp        Clinical Information       Screening for colorectal cancer      Gross Description       A(1). Large Intestine, Colon, Ascending, right colon polyp:  The specimen is received in formalin with proper patient identification, labeled \"right colon polyp\".  The specimen consists of a 0.6 cm tan-white soft tissue fragment which is entirely submitted in A1.     B(2). Large Intestine, Colon, Sigmoid/Rectal, Sigmoid colon polyp:  The specimen is received in formalin with proper patient identification, labeled \"sigmoid colon polyp\".  The specimen consists of a 0.7 cm tan-white soft tissue fragment which is entirely submitted in B1.         Microscopic Description       Microscopic examination has been performed.        Performing Labs       The technical component of this testing was completed at [facility]      Case Images         If you have any questions or concerns, please call the clinic at the number listed above.       Sincerely,      Carlos Manuel Perez MD          "

## 2021-10-14 NOTE — ANESTHESIA PREPROCEDURE EVALUATION
Anesthesia Pre-Procedure Evaluation    Patient: Jerrica Pappas   MRN: [id number] : 1970        Preoperative Diagnosis: Screening for colon cancer [Z12.11]    Procedure : Procedure(s):  COLONOSCOPY, WITH CO2 INSUFFLATION  ESOPHAGOGASTRODUODENOSCOPY, WITH CO2 INSUFFLATION          Past Medical History:   Diagnosis Date     Thyroid disorder       Past Surgical History:   Procedure Laterality Date     LAPAROTOMY MINI, TUBAL LIGATION, COMBINED       OTHER SURGICAL HISTORY      cyst on ears       Allergies   Allergen Reactions     Chloroquine       Social History     Tobacco Use     Smoking status: Never Smoker     Smokeless tobacco: Never Used   Substance Use Topics     Alcohol use: No      Wt Readings from Last 1 Encounters:   10/05/21 124.7 kg (275 lb)        Anesthesia Evaluation   Pt has had prior anesthetic.     No history of anesthetic complications       ROS/MED HX  ENT/Pulmonary:  - neg pulmonary ROS  (-) tobacco use   Neurologic:  - neg neurologic ROS     Cardiovascular:  - neg cardiovascular ROS     METS/Exercise Tolerance: >4 METS    Hematologic:  - neg hematologic  ROS     Musculoskeletal:  - neg musculoskeletal ROS     GI/Hepatic:  - neg GI/hepatic ROS     Renal/Genitourinary:  - neg Renal ROS     Endo:     (+) thyroid problem, Obesity,     Psychiatric/Substance Use:  - neg psychiatric ROS     Infectious Disease:  - neg infectious disease ROS     Malignancy:  - neg malignancy ROS     Other:  - neg other ROS          Physical Exam    Airway  airway exam normal      Mallampati: I   TM distance: > 3 FB   Neck ROM: full   Mouth opening: > 3 cm    Respiratory Devices and Support         Dental  no notable dental history         Cardiovascular   cardiovascular exam normal       Rhythm and rate: regular and normal     Pulmonary   pulmonary exam normal        breath sounds clear to auscultation           OUTSIDE LABS:  CBC:   Lab Results   Component Value Date    WBC 7.9 2015    WBC 7.9 2015     HGB 9.4 (L) 08/31/2015    HGB 7.6 (L) 08/07/2015    HCT 29.9 (L) 08/31/2015    HCT 23.8 (L) 08/07/2015     08/31/2015     08/07/2015     BMP:   Lab Results   Component Value Date     11/10/2020    POTASSIUM 4.3 11/10/2020    CHLORIDE 109 11/10/2020    CO2 27 11/10/2020    BUN 8 11/10/2020    CR 0.77 11/10/2020    GLC 88 11/10/2020    GLC 85 11/10/2020     COAGS: No results found for: PTT, INR, FIBR  POC: No results found for: BGM, HCG, HCGS  HEPATIC: No results found for: ALBUMIN, PROTTOTAL, ALT, AST, GGT, ALKPHOS, BILITOTAL, BILIDIRECT, LISA  OTHER:   Lab Results   Component Value Date    A1C 5.9 01/25/2018    BRIGID 9.0 11/10/2020    TSH 1.34 11/10/2020       Anesthesia Plan    ASA Status:  2   NPO Status:  NPO Appropriate    Anesthesia Type: MAC.     - Reason for MAC: Deep or markedly invasive procedure (G8)   Induction: Propofol.   Maintenance: N/A.        Consents    Anesthesia Plan(s) and associated risks, benefits, and realistic alternatives discussed. Questions answered and patient/representative(s) expressed understanding.     - Discussed with:  Patient    Use of blood products discussed: No .     Postoperative Care            Comments:         H&P reviewed: Unable to attach H&P to encounter due to EHR limitations. H&P Update: appropriate H&P reviewed, patient examined. No interval changes since H&P (within 30 days).         Owen Teague DO

## 2021-10-14 NOTE — ANESTHESIA CARE TRANSFER NOTE
Patient: Jerrica Pappas    Procedure: Procedure(s):  COLONOSCOPY, WITH CO2 INSUFFLATION  Colonoscopy, Flexible, With Lesion Removal Using Snare       Diagnosis: Screening for colon cancer [Z12.11]  Diagnosis Additional Information: No value filed.    Anesthesia Type:   MAC     Note:      Level of Consciousness: awake  Oxygen Supplementation: room air    Independent Airway: airway patency satisfactory and stable  Dentition: dentition unchanged  Vital Signs Stable: post-procedure vital signs reviewed and stable  Report to RN Given: handoff report given  Patient transferred to: Phase II    Handoff Report: Identifed the Patient, Identified the Reponsible Provider, Reviewed the pertinent medical history, Discussed the surgical course, Reviewed Intra-OP anesthesia mangement and issues during anesthesia, Set expectations for post-procedure period and Allowed opportunity for questions and acknowledgement of understanding      Vitals:  Vitals Value Taken Time   BP     Temp     Pulse 89 10/14/21 0848   Resp     SpO2         Electronically Signed By: MAUDE Barrett CRNA  October 14, 2021  8:52 AM

## 2021-10-14 NOTE — LETTER
"October 18, 2021      Jerrica CRONIN Elyse  [address] [place] [address]        Dear ,    We are writing to inform you of your test results.    {results letter list:[id number]}    Resulted Orders   Surgical Pathology Exam   Result Value Ref Range    Case Report       Surgical Pathology Report                         Case: [id number]                                  Authorizing Provider:  Carlos Manuel Perez      Collected:           10/14/2021 08:39 AM                                 MD Yuval                                                                     Ordering Location:     [facility]    Received:            10/14/2021 12:46 PM                                 [place] OR                                                                     Pathologist:           Iman Chen MD                                                             Specimens:   A) - Large Intestine, Colon, Ascending, right colon polyp                                           B) - Large Intestine, Colon, Sigmoid/Rectal, Sigmoid colon polyp                           Final Diagnosis       A. Ascending colon, polyp:  - Colonic mucosa with no significant histologic abnormality; no neoplastic or hyperplastic polyp identified      B. Large intestine, Sigmoid colon, polyp:  - Hyperplastic polyp        Clinical Information       Screening for colorectal cancer      Gross Description       A(1). Large Intestine, Colon, Ascending, right colon polyp:  The specimen is received in formalin with proper patient identification, labeled \"right colon polyp\".  The specimen consists of a 0.6 cm tan-white soft tissue fragment which is entirely submitted in A1.     B(2). Large Intestine, Colon, Sigmoid/Rectal, Sigmoid colon polyp:  The specimen is received in formalin with proper patient identification, labeled \"sigmoid colon polyp\".  The " specimen consists of a 0.7 cm tan-white soft tissue fragment which is entirely submitted in B1.         Microscopic Description       Microscopic examination has been performed.        Performing Labs       The technical component of this testing was completed at [facility]      Case Images         If you have any questions or concerns, please call the clinic at the number listed above.       Sincerely,      Carlos Manuel Perez MD

## 2021-10-14 NOTE — ANESTHESIA POSTPROCEDURE EVALUATION
Patient: Jerrica Pappas    Procedure: Procedure(s):  COLONOSCOPY, WITH CO2 INSUFFLATION  Colonoscopy, Flexible, With Lesion Removal Using Snare       Diagnosis:Screening for colon cancer [Z12.11]  Diagnosis Additional Information: No value filed.    Anesthesia Type:  MAC    Note:  Disposition: Outpatient   Postop Pain Control: Uneventful            Sign Out: Well controlled pain   PONV: No   Neuro/Psych: Uneventful            Sign Out: Acceptable/Baseline neuro status   Airway/Respiratory: Uneventful            Sign Out: Acceptable/Baseline resp. status   CV/Hemodynamics: Uneventful            Sign Out: Acceptable CV status; No obvious hypovolemia; No obvious fluid overload   Other NRE: NONE   DID A NON-ROUTINE EVENT OCCUR? No           Last vitals:  Vitals Value Taken Time   /94 10/14/21 0910   Temp 97.3  F (36.3  C) 10/14/21 0854   Pulse     Resp 14 10/14/21 0910   SpO2 98 % 10/14/21 0910       Electronically Signed By: Owen Teague DO  October 14, 2021  11:13 AM

## 2021-10-18 LAB
PATH REPORT.COMMENTS IMP SPEC: NORMAL
PATH REPORT.COMMENTS IMP SPEC: NORMAL
PATH REPORT.FINAL DX SPEC: NORMAL
PATH REPORT.GROSS SPEC: NORMAL
PATH REPORT.MICROSCOPIC SPEC OTHER STN: NORMAL
PATH REPORT.RELEVANT HX SPEC: NORMAL
PHOTO IMAGE: NORMAL

## 2021-10-18 PROCEDURE — 88305 TISSUE EXAM BY PATHOLOGIST: CPT | Performed by: PATHOLOGY

## 2021-11-10 NOTE — PROGRESS NOTES
SUBJECTIVE:   CC: Jerrica Pappas is an 51 year old woman who presents for preventive health visit.     Patient has been advised of split billing requirements and indicates understanding: Yes  Healthy Habits:    Getting at least 3 servings of Calcium per day:  Yes    Bi-annual eye exam:  NO    Dental care twice a year:  Yes    Sleep apnea or symptoms of sleep apnea:  Excessive snoring    Diet:  Regular (no restrictions)    Frequency of exercise:  1 day/week    Duration of exercise:  15-30 minutes    Taking medications regularly:  Yes    Medication side effects:  None    PHQ-2 Total Score:    Additional concerns today:  No    Hypothyroidism Follow-up      Since last visit, patient describes the following symptoms: Weight stable, no hair loss, no skin changes, no constipation, no loose stools    Discussed dentist and eye doctor    Additional concerns: waking up 2-3x a night to urinate. Started 2 years ago. No dysuria.   - When patient doesn't drink water after 6pm she gets up once to urinate  - 4 kids   - dryness when sexually active           Today's PHQ-2 Score:   PHQ-2 ( 1999 [employer]) 11/11/2021   Q1: Little interest or pleasure in doing things 0   Q2: Feeling down, depressed or hopeless 0   PHQ-2 Score 0   Q1: Little interest or pleasure in doing things Not at all   Q2: Feeling down, depressed or hopeless Not at all   PHQ-2 Score 0       Abuse: Current or Past (Physical, Sexual or Emotional) - No  Do you feel safe in your environment? Yes        Social History     Tobacco Use     Smoking status: Never Smoker     Smokeless tobacco: Never Used   Substance Use Topics     Alcohol use: No     Alcohol Use 11/11/2021   Prescreen: >3 drinks/day or >7 drinks/week? Not Applicable   Prescreen: >3 drinks/day or >7 drinks/week? -     Reviewed orders with patient.  Reviewed health maintenance and updated orders accordingly - Yes  Lab work is in process  Labs reviewed in EPIC  BP Readings from Last 3 Encounters:   11/11/21  103/68   10/14/21 (!) 129/94   10/05/21 112/70    Wt Readings from Last 3 Encounters:   11/11/21 123.3 kg (271 lb 12.8 oz)   10/05/21 124.7 kg (275 lb)   11/10/20 123.8 kg (273 lb)              Breast Cancer Screening:  Any new diagnosis of family breast, ovarian, or bowel cancer? No    FHS-7: No flowsheet data found.    Mammogram Screening: Recommended annual mammography  Pertinent mammograms are reviewed under the imaging tab.    History of abnormal Pap smear: NO - age 30-65 PAP every 5 years with negative HPV co-testing recommended  PAP / HPV Latest Ref Rng & Units 1/25/2018 3/3/2014 11/4/2011   PAP (Historical) - NIL NIL OTHER-NIL EM>40   HPV16 NEG:Negative Negative - -   HPV18 NEG:Negative Negative - -   HRHPV NEG:Negative Negative - -     Reviewed and updated as needed this visit by clinical staff  Tobacco  Allergies  Meds  Problems  Med Hx  Surg Hx  Fam Hx  Soc Hx         Reviewed and updated as needed this visit by Provider  Tobacco  Allergies  Meds  Problems  Med Hx  Surg Hx  Fam Hx        Past Medical History:   Diagnosis Date     Thyroid disorder       Past Surgical History:   Procedure Laterality Date     COLONOSCOPY N/A 10/14/2021    Procedure: Colonoscopy, Flexible, With Lesion Removal Using Snare;  Surgeon: Carlos Manuel Perez MD;  Location: [facility] OR     COLONOSCOPY WITH CO2 INSUFFLATION N/A 10/14/2021    Procedure: COLONOSCOPY, WITH CO2 INSUFFLATION;  Surgeon: Carlos Manuel Perez MD;  Location: [facility] OR     LAPAROTOMY MINI, TUBAL LIGATION, COMBINED       OTHER SURGICAL HISTORY      cyst on ears        Review of Systems   Constitutional: Negative for chills and fever.   HENT: Negative for congestion, ear pain, hearing loss and sore throat.    Eyes: Negative for pain and visual disturbance.   Respiratory: Negative for cough and shortness of breath.    Cardiovascular: Negative for chest pain, palpitations and peripheral edema.   Gastrointestinal: Negative for abdominal pain,  "constipation, diarrhea, heartburn, hematochezia and nausea.   Breasts:  Negative for tenderness, breast mass and discharge.   Genitourinary: Negative for dysuria, frequency, genital sores, hematuria, pelvic pain, urgency, vaginal bleeding and vaginal discharge.   Musculoskeletal: Negative for arthralgias, joint swelling and myalgias.   Skin: Negative for rash.   Neurological: Negative for dizziness, weakness, headaches and paresthesias.   Psychiatric/Behavioral: Negative for mood changes. The patient is not nervous/anxious.           OBJECTIVE:   /68   Temp 98.4  F (36.9  C) (Oral)   Resp 18   Ht 1.69 m (5' 6.54\")   Wt 123.3 kg (271 lb 12.8 oz)   SpO2 98%   BMI 43.17 kg/m    Physical Exam  Constitutional:       General: She is not in acute distress.     Appearance: She is well-developed.   HENT:      Right Ear: External ear normal.      Left Ear: External ear normal.      Nose: Nose normal.      Mouth/Throat:      Pharynx: No oropharyngeal exudate.   Eyes:      General:         Right eye: No discharge.         Left eye: No discharge.      Conjunctiva/sclera: Conjunctivae normal.      Pupils: Pupils are equal, round, and reactive to light.   Neck:      Thyroid: No thyromegaly.      Vascular: No JVD.      Trachea: No tracheal deviation.   Cardiovascular:      Rate and Rhythm: Normal rate and regular rhythm.      Heart sounds: Normal heart sounds. No murmur heard.  No friction rub. No gallop.    Pulmonary:      Effort: Pulmonary effort is normal. No respiratory distress.      Breath sounds: Normal breath sounds. No stridor. No wheezing or rales.   Chest:   Breasts: Breasts are symmetrical.      Right: No inverted nipple, mass, nipple discharge or skin change.      Left: No inverted nipple, mass, nipple discharge or skin change.       Abdominal:      General: Bowel sounds are normal. There is no distension.      Palpations: Abdomen is soft. There is no mass.      Tenderness: There is no abdominal tenderness. " There is no guarding or rebound.      Hernia: No hernia is present.   Musculoskeletal:         General: Normal range of motion.      Cervical back: Normal range of motion and neck supple.   Lymphadenopathy:      Cervical: No cervical adenopathy.   Skin:     General: Skin is warm and dry.   Neurological:      Mental Status: She is alert and oriented to person, place, and time.   Psychiatric:         Behavior: Behavior normal.         Thought Content: Thought content normal.         Judgment: Judgment normal.           Diagnostic Test Results:  Labs reviewed in Epic  See orders pending in Epic     ASSESSMENT/PLAN:       ICD-10-CM    1. Routine general medical examination at a health care facility  Z00.00    2. Acquired hypothyroidism  E03.9 TSH with free T4 reflex     levothyroxine (SYNTHROID/LEVOTHROID) 112 MCG tablet     TSH with free T4 reflex   3. Screening for diabetes mellitus  Z13.1 GLUCOSE     GLUCOSE   4. Family history of diabetes mellitus  Z83.3    5. Screening for lipoid disorders  Z13.220 Lipid panel reflex to direct LDL Fasting     Lipid panel reflex to direct LDL Fasting   6. Morbid obesity (H)  E66.01    7. Stable angina pectoris (H)  I20.8    8. Female stress incontinence  N39.3        Thyroid   - Stable on current regimen, due for lab update   - Results will be communicated to patient when available and appropriate medication changes made if necessary     Urinary frequency   - Possibly overactive bladder vs. Stress incontinence   - Discussed Kegels vs trial of Oxybutynin vs. Urology referral      Patient would like to try Kegel's, will return to clinic if doesn't improve or worsens         Patient has been advised of split billing requirements and indicates understanding: Yes  COUNSELING:  Reviewed preventive health counseling, as reflected in patient instructions  Special attention given to:        Regular exercise       Healthy diet/nutrition       Perimenopause management         Estimated body  "mass index is 43.17 kg/m  as calculated from the following:    Height as of this encounter: 1.69 m (5' 6.54\").    Weight as of this encounter: 123.3 kg (271 lb 12.8 oz).    Weight management plan: Discussed healthy diet and exercise guidelines    She reports that she has never smoked. She has never used smokeless tobacco.      Counseling Resources:  ATP IV Guidelines  Pooled Cohorts Equation Calculator  Breast Cancer Risk Calculator  BRCA-Related Cancer Risk Assessment: FHS-7 Tool  FRAX Risk Assessment  ICSI Preventive Guidelines  Dietary Guidelines for Americans, 2010  USDA's MyPlate  ASA Prophylaxis  Lung CA Screening    Review of the result(s) of each unique test - See list       11/10/20 - Glucose, lipid, BMP, TSH        12/1/20 - mammogram         10/14/21 - Colonoscopy   Diagnosis or treatment significantly limited by social determinants of health - None     30 minutes spent on the date of the encounter doing chart review, history and exam, documentation and further activities as noted above    The patient indicates understanding of these issues and agrees with the plan.    Follow up: 1 year or prn     I, KRYSTLE CruzC,  was present with the PA student who participated in the service and in the documentation of the note.  I have verified the history and personally performed the physical exam and medical decision making.  I agree with the assessment and plan of care as documented in the note.     ASHLEY Potts-S2  [facility] KRYSTLE HaridnC  [facility]  "

## 2021-11-11 ENCOUNTER — OFFICE VISIT (OUTPATIENT)
Dept: FAMILY MEDICINE | Facility: OTHER | Age: 51
End: 2021-11-11
Payer: COMMERCIAL

## 2021-11-11 VITALS
HEART RATE: 74 BPM | WEIGHT: 271.8 LBS | BODY MASS INDEX: 42.66 KG/M2 | DIASTOLIC BLOOD PRESSURE: 68 MMHG | OXYGEN SATURATION: 98 % | TEMPERATURE: 98.4 F | SYSTOLIC BLOOD PRESSURE: 103 MMHG | RESPIRATION RATE: 18 BRPM | HEIGHT: 67 IN

## 2021-11-11 DIAGNOSIS — Z83.3 FAMILY HISTORY OF DIABETES MELLITUS: ICD-10-CM

## 2021-11-11 DIAGNOSIS — I20.89 STABLE ANGINA PECTORIS (H): ICD-10-CM

## 2021-11-11 DIAGNOSIS — E66.01 MORBID OBESITY (H): ICD-10-CM

## 2021-11-11 DIAGNOSIS — E03.9 ACQUIRED HYPOTHYROIDISM: ICD-10-CM

## 2021-11-11 DIAGNOSIS — Z13.220 SCREENING FOR LIPOID DISORDERS: ICD-10-CM

## 2021-11-11 DIAGNOSIS — N39.3 FEMALE STRESS INCONTINENCE: ICD-10-CM

## 2021-11-11 DIAGNOSIS — Z00.00 ROUTINE GENERAL MEDICAL EXAMINATION AT A HEALTH CARE FACILITY: Primary | ICD-10-CM

## 2021-11-11 DIAGNOSIS — Z13.1 SCREENING FOR DIABETES MELLITUS: ICD-10-CM

## 2021-11-11 LAB
CHOLEST SERPL-MCNC: 123 MG/DL
FASTING STATUS PATIENT QL REPORTED: YES
FASTING STATUS PATIENT QL REPORTED: YES
GLUCOSE BLD-MCNC: 95 MG/DL (ref 70–99)
HDLC SERPL-MCNC: 48 MG/DL
LDLC SERPL CALC-MCNC: 68 MG/DL
NONHDLC SERPL-MCNC: 75 MG/DL
TRIGL SERPL-MCNC: 35 MG/DL
TSH SERPL DL<=0.005 MIU/L-ACNC: 1.08 MU/L (ref 0.4–4)

## 2021-11-11 PROCEDURE — 82947 ASSAY GLUCOSE BLOOD QUANT: CPT | Performed by: PHYSICIAN ASSISTANT

## 2021-11-11 PROCEDURE — 36415 COLL VENOUS BLD VENIPUNCTURE: CPT | Performed by: PHYSICIAN ASSISTANT

## 2021-11-11 PROCEDURE — 84443 ASSAY THYROID STIM HORMONE: CPT | Performed by: PHYSICIAN ASSISTANT

## 2021-11-11 PROCEDURE — 80061 LIPID PANEL: CPT | Performed by: PHYSICIAN ASSISTANT

## 2021-11-11 PROCEDURE — 99396 PREV VISIT EST AGE 40-64: CPT | Performed by: PHYSICIAN ASSISTANT

## 2021-11-11 RX ORDER — SODIUM FLUORIDE 6 MG/ML
PASTE, DENTIFRICE DENTAL
COMMUNITY
Start: 2021-05-07 | End: 2023-01-26

## 2021-11-11 RX ORDER — LEVOTHYROXINE SODIUM 112 UG/1
112 TABLET ORAL DAILY
Qty: 90 TABLET | Refills: 3 | Status: SHIPPED | OUTPATIENT
Start: 2021-11-11 | End: 2022-11-28

## 2021-11-11 ASSESSMENT — ENCOUNTER SYMPTOMS
SHORTNESS OF BREATH: 0
NERVOUS/ANXIOUS: 0
DIARRHEA: 0
HEMATURIA: 0
JOINT SWELLING: 0
ABDOMINAL PAIN: 0
CHILLS: 0
COUGH: 0
HEADACHES: 0
BREAST MASS: 0
PALPITATIONS: 0
FREQUENCY: 1
WEAKNESS: 0
DYSURIA: 0
EYE PAIN: 0
HEARTBURN: 0
SORE THROAT: 0
ARTHRALGIAS: 0
HEMATOCHEZIA: 0
ARTHRALGIAS: 1
MYALGIAS: 0
FEVER: 0
CONSTIPATION: 0
PARESTHESIAS: 0
NAUSEA: 0
FREQUENCY: 0
DIZZINESS: 0

## 2021-11-11 ASSESSMENT — PAIN SCALES - GENERAL: PAINLEVEL: MILD PAIN (3)

## 2021-11-11 ASSESSMENT — MIFFLIN-ST. JEOR: SCORE: 1878.12

## 2021-11-11 NOTE — LETTER
November 12, 2021      Jerrica Osoriogriselda  [address] [place] [address]        Dear ,    We are writing to inform you of your test results.    Your test results fall within the expected range(s) or remain unchanged from previous results.  Please continue with current treatment plan.    Resulted Orders   TSH with free T4 reflex   Result Value Ref Range    TSH 1.08 0.40 - 4.00 mU/L   Lipid panel reflex to direct LDL Fasting   Result Value Ref Range    Cholesterol 123 <200 mg/dL    Triglycerides 35 <150 mg/dL    Direct Measure HDL 48 (L) >=50 mg/dL    LDL Cholesterol Calculated 68 <=100 mg/dL    Non HDL Cholesterol 75 <130 mg/dL    Patient Fasting > 8hrs? Yes     Narrative    Cholesterol  Desirable:  <200 mg/dL    Triglycerides  Normal:  Less than 150 mg/dL  Borderline High:  150-199 mg/dL  High:  200-499 mg/dL  Very High:  Greater than or equal to 500 mg/dL    Direct Measure HDL  Female:  Greater than or equal to 50 mg/dL   Male:  Greater than or equal to 40 mg/dL    LDL Cholesterol  Desirable:  <100mg/dL  Above Desirable:  100-129 mg/dL   Borderline High:  130-159 mg/dL   High:  160-189 mg/dL   Very High:  >= 190 mg/dL    Non HDL Cholesterol  Desirable:  130 mg/dL  Above Desirable:  130-159 mg/dL  Borderline High:  160-189 mg/dL  High:  190-219 mg/dL  Very High:  Greater than or equal to 220 mg/dL   GLUCOSE   Result Value Ref Range    Glucose 95 70 - 99 mg/dL    Patient Fasting > 8hrs? Yes        If you have any questions or concerns, please call the clinic at the number listed above.       Sincerely,      Renetta Wheeler PA-C

## 2021-12-17 ENCOUNTER — IMMUNIZATION (OUTPATIENT)
Dept: FAMILY MEDICINE | Facility: CLINIC | Age: 51
End: 2021-12-17
Payer: COMMERCIAL

## 2021-12-17 DIAGNOSIS — Z23 HIGH PRIORITY FOR 2019-NCOV VACCINE: Primary | ICD-10-CM

## 2021-12-17 PROCEDURE — 91306 COVID-19,PF,MODERNA (18+ YRS BOOSTER .25ML): CPT

## 2021-12-17 PROCEDURE — 99207 PR NO CHARGE LOS: CPT

## 2021-12-17 PROCEDURE — 0064A COVID-19,PF,MODERNA (18+ YRS BOOSTER .25ML): CPT

## 2022-07-15 NOTE — LETTER
[address]  [facility] [address]  Phone: [phone]    October 17, 2018        Jerrica Pappas  [facility]   [facility] [address]          To whom it may concern:    RE: Jerrica Pappas    Patient was seen and treated today at our clinic.  Please excuse from work 10/17/18.       Please contact me for questions or concerns.      Sincerely,        Deep Miller PA-C   (E4) spontaneous

## 2022-11-17 ENCOUNTER — DOCUMENTATION ONLY (OUTPATIENT)
Dept: LAB | Facility: OTHER | Age: 52
End: 2022-11-17

## 2022-11-17 DIAGNOSIS — Z13.1 SCREENING FOR DIABETES MELLITUS: ICD-10-CM

## 2022-11-17 DIAGNOSIS — Z13.220 SCREENING FOR LIPID DISORDERS: ICD-10-CM

## 2022-11-17 DIAGNOSIS — E03.9 ACQUIRED HYPOTHYROIDISM: Primary | ICD-10-CM

## 2022-11-17 NOTE — PROGRESS NOTES
Patient is coming in for labs  Please place orders if needed   thank you  Kiya Waller (MLT) [facility]

## 2022-11-21 ENCOUNTER — LAB (OUTPATIENT)
Dept: LAB | Facility: OTHER | Age: 52
End: 2022-11-21
Payer: COMMERCIAL

## 2022-11-21 DIAGNOSIS — E03.9 ACQUIRED HYPOTHYROIDISM: ICD-10-CM

## 2022-11-21 DIAGNOSIS — Z13.220 SCREENING FOR LIPID DISORDERS: ICD-10-CM

## 2022-11-21 DIAGNOSIS — Z13.1 SCREENING FOR DIABETES MELLITUS: ICD-10-CM

## 2022-11-21 LAB
CHOLEST SERPL-MCNC: 125 MG/DL
FASTING STATUS PATIENT QL REPORTED: NO
FASTING STATUS PATIENT QL REPORTED: NO
GLUCOSE BLD-MCNC: 106 MG/DL (ref 70–99)
HDLC SERPL-MCNC: 43 MG/DL
LDLC SERPL CALC-MCNC: 70 MG/DL
NONHDLC SERPL-MCNC: 82 MG/DL
TRIGL SERPL-MCNC: 62 MG/DL
TSH SERPL DL<=0.005 MIU/L-ACNC: 0.83 MU/L (ref 0.4–4)

## 2022-11-21 PROCEDURE — 82947 ASSAY GLUCOSE BLOOD QUANT: CPT

## 2022-11-21 PROCEDURE — 80061 LIPID PANEL: CPT

## 2022-11-21 PROCEDURE — 84443 ASSAY THYROID STIM HORMONE: CPT

## 2022-11-21 PROCEDURE — 36415 COLL VENOUS BLD VENIPUNCTURE: CPT

## 2022-11-22 NOTE — RESULT ENCOUNTER NOTE
Please call patient    Glucose elevated but normal if not fasting. She will still need a visit yearly for medication refills (thyroid). OK for this to  be virtual to discuss medication refills and review labs     Cedrick Wheeler PA-C

## 2022-11-25 ENCOUNTER — TELEPHONE (OUTPATIENT)
Dept: FAMILY MEDICINE | Facility: OTHER | Age: 52
End: 2022-11-25

## 2022-11-25 ENCOUNTER — LAB (OUTPATIENT)
Dept: LAB | Facility: OTHER | Age: 52
End: 2022-11-25
Payer: COMMERCIAL

## 2022-11-25 DIAGNOSIS — R76.11 HISTORY OF MANTOUX POSITIVE, TREATMENT STATUS UNKNOWN: ICD-10-CM

## 2022-11-25 DIAGNOSIS — R76.11 HISTORY OF MANTOUX POSITIVE, TREATMENT STATUS UNKNOWN: Primary | ICD-10-CM

## 2022-11-25 DIAGNOSIS — R76.11 MANTOUX: POSITIVE: Primary | ICD-10-CM

## 2022-11-25 PROCEDURE — 86481 TB AG RESPONSE T-CELL SUSP: CPT

## 2022-11-25 PROCEDURE — 36415 COLL VENOUS BLD VENIPUNCTURE: CPT

## 2022-11-25 NOTE — TELEPHONE ENCOUNTER
Order/Referral Request    Who is requesting: Pt is requesting orders for TB blood draw     Orders being requested: TB blood test     Reason service is needed/diagnosis: Pt stated she had a postive TB test and was told she needs to get a blood draw.     When are orders needed by: ASAP     Has this been discussed with Provider: No    Does patient have a preference on a Group/Provider/Facility? N/a    Does patient have an appointment scheduled?: No    Where to send orders:     Okay to leave a detailed message?: Yes at Home number on file [phone] (home)

## 2022-11-25 NOTE — PROGRESS NOTES
Patient was in today for TB test. Please call her to inform her that she is overdue for annual checkup. Please help her schedule this with her PCP.    Raymond Dean PA-C on 11/25/2022 at 12:32 PM

## 2022-11-25 NOTE — TELEPHONE ENCOUNTER
For requests like this in the future she should really have a visit since she is following up for additional testing on an abnormal test. I did put the order in but she should schedule a follow-up virtual visit with PCP to discuss results and why her mantoux may have been abnormal.     Deep Miller PA-C

## 2022-11-26 DIAGNOSIS — E03.9 ACQUIRED HYPOTHYROIDISM: ICD-10-CM

## 2022-11-27 LAB
GAMMA INTERFERON BACKGROUND BLD IA-ACNC: 0.19 IU/ML
M TB IFN-G BLD-IMP: NEGATIVE
M TB IFN-G CD4+ BCKGRND COR BLD-ACNC: 9.81 IU/ML
MITOGEN IGNF BCKGRD COR BLD-ACNC: 0.01 IU/ML
MITOGEN IGNF BCKGRD COR BLD-ACNC: 0.17 IU/ML
QUANTIFERON MITOGEN: 10 IU/ML
QUANTIFERON NIL TUBE: 0.19 IU/ML
QUANTIFERON TB1 TUBE: 0.2 IU/ML
QUANTIFERON TB2 TUBE: 0.36

## 2022-11-28 ENCOUNTER — TELEPHONE (OUTPATIENT)
Dept: FAMILY MEDICINE | Facility: OTHER | Age: 52
End: 2022-11-28

## 2022-11-28 RX ORDER — LEVOTHYROXINE SODIUM 112 UG/1
112 TABLET ORAL DAILY
Qty: 30 TABLET | Refills: 0 | Status: SHIPPED | OUTPATIENT
Start: 2022-11-28 | End: 2023-01-26

## 2022-11-28 NOTE — TELEPHONE ENCOUNTER
----- Message from Raymond Dean PA-C sent at 11/27/2022  4:12 PM CST -----  Please call with results. Please inform patient that TB test returned negative. She is overdue for follow up with PCP as she was last seen 1 year ago. Visit is required in order to continue medications, please help her schedule this.      Raymond Dean PA-C on 11/27/2022 at 4:11 PM

## 2022-11-28 NOTE — TELEPHONE ENCOUNTER
"Pending Prescriptions:                       Disp   Refills    levothyroxine (SYNTHROID/LEVOTHROID) 112 M*90 tab*0        Sig: TAKE ONE TABLET BY MOUTH ONE TIME DAILY    Routing refill request to provider for review/approval because:  Patient needs to be seen because it has been more than 1 year since last office visit.  Requested Prescriptions   Pending Prescriptions Disp Refills    levothyroxine (SYNTHROID/LEVOTHROID) 112 MCG tablet [Pharmacy Med Name: Levothyroxine Sodium Oral Tablet 112 MCG] 90 tablet 0     Sig: TAKE ONE TABLET BY MOUTH ONE TIME DAILY       Thyroid Protocol Passed - 11/26/2022  2:00 AM        Passed - Patient is 12 years or older        Passed - Recent (12 mo) or future (30 days) visit within the authorizing provider's specialty     Patient has had an office visit with the authorizing provider or a provider within the authorizing providers department within the previous 12 mos or has a future within next 30 days. See \"Patient Info\" tab in inbasket, or \"Choose Columns\" in Meds & Orders section of the refill encounter.              Passed - Medication is active on med list        Passed - Normal TSH on file in past 12 months     Recent Labs   Lab Test 11/21/22  1326   TSH 0.83              Passed - No active pregnancy on record     If patient is pregnant or has had a positive pregnancy test, please check TSH.          Passed - No positive pregnancy test in past 12 months     If patient is pregnant or has had a positive pregnancy test, please check TSH.                     "

## 2022-11-29 NOTE — TELEPHONE ENCOUNTER
Left message for pt to return our call for lab results. This was already drawn. Also to scheduled her annual

## 2023-01-26 ENCOUNTER — OFFICE VISIT (OUTPATIENT)
Dept: FAMILY MEDICINE | Facility: OTHER | Age: 53
End: 2023-01-26
Payer: COMMERCIAL

## 2023-01-26 ENCOUNTER — ANCILLARY PROCEDURE (OUTPATIENT)
Dept: GENERAL RADIOLOGY | Facility: OTHER | Age: 53
End: 2023-01-26
Attending: PHYSICIAN ASSISTANT
Payer: COMMERCIAL

## 2023-01-26 VITALS
HEIGHT: 67 IN | WEIGHT: 275 LBS | HEART RATE: 75 BPM | OXYGEN SATURATION: 100 % | SYSTOLIC BLOOD PRESSURE: 115 MMHG | TEMPERATURE: 98.2 F | DIASTOLIC BLOOD PRESSURE: 77 MMHG | BODY MASS INDEX: 43.16 KG/M2 | RESPIRATION RATE: 15 BRPM

## 2023-01-26 DIAGNOSIS — Z83.3 FAMILY HISTORY OF DIABETES MELLITUS: ICD-10-CM

## 2023-01-26 DIAGNOSIS — Z00.00 ROUTINE GENERAL MEDICAL EXAMINATION AT A HEALTH CARE FACILITY: Primary | ICD-10-CM

## 2023-01-26 DIAGNOSIS — Z12.4 CERVICAL CANCER SCREENING: ICD-10-CM

## 2023-01-26 DIAGNOSIS — Z12.31 VISIT FOR SCREENING MAMMOGRAM: ICD-10-CM

## 2023-01-26 DIAGNOSIS — R73.09 ELEVATED GLUCOSE: ICD-10-CM

## 2023-01-26 DIAGNOSIS — M25.561 ACUTE PAIN OF RIGHT KNEE: ICD-10-CM

## 2023-01-26 DIAGNOSIS — E03.9 ACQUIRED HYPOTHYROIDISM: ICD-10-CM

## 2023-01-26 LAB — HBA1C MFR BLD: 6 % (ref 0–5.6)

## 2023-01-26 PROCEDURE — 99396 PREV VISIT EST AGE 40-64: CPT | Performed by: PHYSICIAN ASSISTANT

## 2023-01-26 PROCEDURE — G0145 SCR C/V CYTO,THINLAYER,RESCR: HCPCS | Performed by: PHYSICIAN ASSISTANT

## 2023-01-26 PROCEDURE — 87624 HPV HI-RISK TYP POOLED RSLT: CPT | Performed by: PHYSICIAN ASSISTANT

## 2023-01-26 PROCEDURE — 99214 OFFICE O/P EST MOD 30 MIN: CPT | Mod: 25 | Performed by: PHYSICIAN ASSISTANT

## 2023-01-26 PROCEDURE — 36415 COLL VENOUS BLD VENIPUNCTURE: CPT | Performed by: PHYSICIAN ASSISTANT

## 2023-01-26 PROCEDURE — 83036 HEMOGLOBIN GLYCOSYLATED A1C: CPT | Performed by: PHYSICIAN ASSISTANT

## 2023-01-26 PROCEDURE — 73562 X-RAY EXAM OF KNEE 3: CPT | Mod: TC | Performed by: RADIOLOGY

## 2023-01-26 RX ORDER — LEVOTHYROXINE SODIUM 112 UG/1
112 TABLET ORAL DAILY
Qty: 90 TABLET | Refills: 3 | Status: SHIPPED | OUTPATIENT
Start: 2023-01-26 | End: 2024-02-07

## 2023-01-26 ASSESSMENT — ENCOUNTER SYMPTOMS
SHORTNESS OF BREATH: 0
COUGH: 0
PALPITATIONS: 0
NERVOUS/ANXIOUS: 0
ARTHRALGIAS: 1
FEVER: 0
JOINT SWELLING: 0
HEADACHES: 0
DIARRHEA: 0
HEMATURIA: 0
PARESTHESIAS: 0
EYE PAIN: 0
CHILLS: 0
FREQUENCY: 1
DYSURIA: 0
MYALGIAS: 0
ABDOMINAL PAIN: 0
CONSTIPATION: 0
BREAST MASS: 0
HEMATOCHEZIA: 0
HEARTBURN: 0
WEAKNESS: 0
SORE THROAT: 0
DIZZINESS: 0
NAUSEA: 0

## 2023-01-26 ASSESSMENT — PAIN SCALES - GENERAL: PAINLEVEL: NO PAIN (0)

## 2023-01-26 NOTE — PROGRESS NOTES
SUBJECTIVE:   CC: Jerrica is an 52 year old who presents for preventive health visit.   Patient has been advised of split billing requirements and indicates understanding: Yes  Healthy Habits:     Getting at least 3 servings of Calcium per day:  Yes    Bi-annual eye exam:  Yes    Dental care twice a year:  Yes    Sleep apnea or symptoms of sleep apnea:  Excessive snoring    Diet:  Regular (no restrictions)    Frequency of exercise:  2-3 days/week    Duration of exercise:  15-30 minutes    Taking medications regularly:  Yes    Medication side effects:  None    PHQ-2 Total Score: 0    Additional concerns today:  No      Joint Pain    Onset: beginning of winter    Description:   Location: right knee  Character: Sharp, Stabbing and Burning    Intensity: 8/10    Progression of Symptoms: same    Accompanying Signs & Symptoms:  Other symptoms: radiation of pain to hip    History:   Previous similar pain: YES      Precipitating factors:   Trauma or overuse: YES- no trauma, walking worsens pain    Alleviating factors:  Improved by: support wrap and acetaminophen  Therapies Tried and outcome: Ace Wrap and Tylenol - both effective    - On and off   - Certain movements hurt like stairs   - Only feels better with ace wrap           Today's PHQ-2 Score:   PHQ-2 ( 1999 [employer]) 1/26/2023   Q1: Little interest or pleasure in doing things 0   Q2: Feeling down, depressed or hopeless 0   PHQ-2 Score 0   PHQ-2 Total Score (12-17 Years)- Positive if 3 or more points; Administer PHQ-A if positive -   Q1: Little interest or pleasure in doing things Not at all   Q2: Feeling down, depressed or hopeless Not at all   PHQ-2 Score 0           Social History     Tobacco Use     Smoking status: Never     Smokeless tobacco: Never   Substance Use Topics     Alcohol use: No         Alcohol Use 1/26/2023   Prescreen: >3 drinks/day or >7 drinks/week? Not Applicable   Prescreen: >3 drinks/day or >7 drinks/week? -       Reviewed orders with patient.   Reviewed health maintenance and updated orders accordingly - Yes  Lab work is in process  Labs reviewed in EPIC  BP Readings from Last 3 Encounters:   01/26/23 115/77   11/11/21 103/68   10/14/21 (!) 129/94    Wt Readings from Last 3 Encounters:   01/26/23 124.7 kg (275 lb)   11/11/21 123.3 kg (271 lb 12.8 oz)   10/05/21 124.7 kg (275 lb)                    Breast Cancer Screening:    Breast CA Risk Assessment (FHS-7) 11/11/2021   Do you have a family history of breast, colon, or ovarian cancer? No / Unknown         Mammogram Screening: Recommended annual mammography  Pertinent mammograms are reviewed under the imaging tab.    History of abnormal Pap smear: NO - age 30-65 PAP every 5 years with negative HPV co-testing recommended  PAP / HPV Latest Ref Rng & Units 1/25/2018 3/3/2014 11/4/2011   PAP (Historical) - NIL NIL OTHER-NIL EM>40   HPV16 NEG:Negative Negative - -   HPV18 NEG:Negative Negative - -   HRHPV NEG:Negative Negative - -     Reviewed and updated as needed this visit by clinical staff   Tobacco  Allergies  Meds  Problems  Med Hx  Surg Hx  Fam Hx          Reviewed and updated as needed this visit by Provider   Tobacco  Allergies  Meds  Problems  Med Hx  Surg Hx  Fam Hx         Past Medical History:   Diagnosis Date     Thyroid disorder       Past Surgical History:   Procedure Laterality Date     COLONOSCOPY N/A 10/14/2021    Procedure: Colonoscopy, Flexible, With Lesion Removal Using Snare;  Surgeon: Carlos Manuel Perez MD;  Location: [facility] OR     COLONOSCOPY WITH CO2 INSUFFLATION N/A 10/14/2021    Procedure: COLONOSCOPY, WITH CO2 INSUFFLATION;  Surgeon: Carlos Manuel Perez MD;  Location: [facility] OR     LAPAROTOMY MINI, TUBAL LIGATION, COMBINED       OTHER SURGICAL HISTORY      cyst on ears        Review of Systems   Constitutional: Negative for chills and fever.   HENT: Negative for congestion, ear pain, hearing loss and sore throat.    Eyes: Negative for pain and visual  "disturbance.   Respiratory: Negative for cough and shortness of breath.    Cardiovascular: Negative for chest pain, palpitations and peripheral edema.   Gastrointestinal: Negative for abdominal pain, constipation, diarrhea, heartburn, hematochezia and nausea.   Breasts:  Negative for tenderness, breast mass and discharge.   Genitourinary: Positive for frequency. Negative for dysuria, genital sores, hematuria, pelvic pain, urgency, vaginal bleeding and vaginal discharge.   Musculoskeletal: Positive for arthralgias. Negative for joint swelling and myalgias.   Skin: Negative for rash.   Neurological: Negative for dizziness, weakness, headaches and paresthesias.   Psychiatric/Behavioral: Negative for mood changes. The patient is not nervous/anxious.           OBJECTIVE:   /77 (Cuff Size: Adult Large)   Pulse 75   Temp 98.2  F (36.8  C) (Oral)   Resp 15   Ht 1.69 m (5' 6.54\")   Wt 124.7 kg (275 lb)   SpO2 100%   BMI 43.68 kg/m    Physical Exam  Constitutional:       General: She is not in acute distress.     Appearance: She is well-developed.   HENT:      Right Ear: External ear normal.      Left Ear: External ear normal.      Nose: Nose normal.      Mouth/Throat:      Pharynx: No oropharyngeal exudate.   Eyes:      General:         Right eye: No discharge.         Left eye: No discharge.      Conjunctiva/sclera: Conjunctivae normal.      Pupils: Pupils are equal, round, and reactive to light.   Neck:      Thyroid: No thyromegaly.      Vascular: No JVD.      Trachea: No tracheal deviation.   Cardiovascular:      Rate and Rhythm: Normal rate and regular rhythm.      Heart sounds: Normal heart sounds. No murmur heard.    No friction rub. No gallop.   Pulmonary:      Effort: Pulmonary effort is normal. No respiratory distress.      Breath sounds: Normal breath sounds. No stridor. No wheezing or rales.   Chest:   Breasts:     Breasts are symmetrical.      Right: No inverted nipple, mass, nipple discharge or skin " change.      Left: No inverted nipple, mass, nipple discharge or skin change.   Abdominal:      General: Bowel sounds are normal. There is no distension.      Palpations: Abdomen is soft. There is no mass.      Tenderness: There is no abdominal tenderness. There is no guarding or rebound.      Hernia: No hernia is present.   Genitourinary:     Vagina: Normal. No vaginal discharge.      Cervix: No cervical motion tenderness or discharge.      Adnexa:         Right: No mass, tenderness or fullness.          Left: No mass, tenderness or fullness.     Musculoskeletal:      Cervical back: Normal range of motion and neck supple.      Right hip: Normal.      Left hip: Normal.      Right knee: Bony tenderness (superior area of patella ) and crepitus present. No deformity or effusion. Decreased range of motion. Tenderness present over the patellar tendon. No LCL laxity, MCL laxity, ACL laxity or PCL laxity. Abnormal meniscus.      Instability Tests: Anterior drawer test negative. Posterior drawer test negative. Medial Joan test positive and lateral Joan test positive.      Left knee: Normal. No bony tenderness or crepitus. Normal range of motion. No tenderness. No LCL laxity, MCL laxity, ACL laxity or PCL laxity.Normal meniscus.      Instability Tests: Anterior drawer test negative. Posterior drawer test negative. Medial Joan test negative and lateral Joan test negative.      Right ankle: Normal.      Left ankle: Normal.   Lymphadenopathy:      Cervical: No cervical adenopathy.   Skin:     General: Skin is warm and dry.   Neurological:      Mental Status: She is alert and oriented to person, place, and time.   Psychiatric:         Behavior: Behavior normal.         Thought Content: Thought content normal.         Judgment: Judgment normal.     Exam limited by body habitus       Diagnostic Test Results:  Labs reviewed in Epic  See list pending in Epic     XR right knee: Preliminary reading - normal bone  structure with no evidence of fracture. Final pending review by radiology.       ASSESSMENT/PLAN:       ICD-10-CM    1. Routine general medical examination at a health care facility  Z00.00       2. Visit for screening mammogram  Z12.31 MA SCREENING DIGITAL BILAT - Future  (s+30)      3. Cervical cancer screening  Z12.4 Pap Screen with HPV - recommended age 30 - 65 years     HPV Hold (Lab Only)      4. Elevated glucose  R73.09 Hemoglobin A1c     Hemoglobin A1c      5. Family history of diabetes mellitus  Z83.3       6. Acquired hypothyroidism  E03.9 levothyroxine (SYNTHROID/LEVOTHROID) 112 MCG tablet      7. Acute pain of right knee  M25.561 XR Knee Right 3 Views     Orthopedic  Referral        1-3.   - Patient to schedule mammogram  - Await pap results     4. & 5. Elevated glucose   - Mild elevation in the past, strong family history of diabetes   - Continue with monitoring, await results today     6. Thyroid   - Patient feels is in a good range   - Last TSH was done in the fall and was normal range  - Reviewed medication use and side effects, refilled     7. Right knee pain   - About 6-8 weeks, no known injury   - Exam concerning for meniscal injury   - X-rays were negative for fracture or malalignment   - Will refer to ortho for further evaluation and management       Patient has been advised of split billing requirements and indicates understanding: Yes      COUNSELING:  Reviewed preventive health counseling, as reflected in patient instructions  Special attention given to:        Regular exercise       Healthy diet/nutrition       Immunizations    Declined: Covid-19 and Influenza due to Other            Osteoporosis prevention/bone health       Colorectal Cancer Screening       Advance Care Planning    She reports that she has never smoked. She has never used smokeless tobacco.      Review of the result(s) of each unique test - See list       Today - XR right knee       11/21/22 - Glucose, Lipid, TSH         10/14/21 - Colonoscopy         1/25/18 - PAP   Diagnosis or treatment significantly limited by social determinants of health - None     30 minutes spent on the date of the encounter doing chart review, history and exam, documentation and further activities as noted above    The patient indicates understanding of these issues and agrees with the plan.    Renetta Wheeler PA-C  [facility]

## 2023-01-27 NOTE — RESULT ENCOUNTER NOTE
Please call patient with the following message    A1C is 6.0. This puts her into the pre-diabetes range. I recommend a visit to discuss.     Cedrick Wheeler PA-C

## 2023-01-30 LAB
BKR LAB AP GYN ADEQUACY: NORMAL
BKR LAB AP GYN INTERPRETATION: NORMAL
BKR LAB AP HPV REFLEX: NORMAL
BKR LAB AP PREVIOUS ABNORMAL: NORMAL
PATH REPORT.COMMENTS IMP SPEC: NORMAL
PATH REPORT.COMMENTS IMP SPEC: NORMAL
PATH REPORT.RELEVANT HX SPEC: NORMAL

## 2023-01-31 LAB
HUMAN PAPILLOMA VIRUS 16 DNA: NEGATIVE
HUMAN PAPILLOMA VIRUS 18 DNA: NEGATIVE
HUMAN PAPILLOMA VIRUS FINAL DIAGNOSIS: NORMAL
HUMAN PAPILLOMA VIRUS OTHER HR: NEGATIVE

## 2023-02-06 NOTE — PROGRESS NOTES
Jerrica Pappas  :  1970  DOS: 2023  MRN: [id number]  PCP: Renetta Wheeler    Sports Medicine Clinic Visit    HPI  Jerrica Pappas is a 52 year old female who is seen in consultation at the request of Renetta Wheeler PA-C presenting with right knee pain.     She notes anterior knee pain that waxes and wanes for past few months, worsening over the past 6 weeks. Pain with walking and stairs. Notes that compression wrapping is helpful. Tylenol and ibuprofen have been minimally helpful. Is walking with a limp today. Denies numbness, tingling, grinding and cracking, mechanical locking symptoms. Endorses a burning-type pain at night occasionally.  Has not tried therapy, bracing, injections.    Social History: currently employed as a nurse    Review of Systems  Musculoskeletal: as above  Remainder of review of systems is negative including constitutional, CV, pulmonary, GI, Skin and Neurologic except as noted in HPI or medical history.    Past Medical History:   Diagnosis Date     Thyroid disorder      Past Surgical History:   Procedure Laterality Date     COLONOSCOPY N/A 10/14/2021    Procedure: Colonoscopy, Flexible, With Lesion Removal Using Snare;  Surgeon: Carlos Manuel Perez MD;  Location: [facility]     COLONOSCOPY WITH CO2 INSUFFLATION N/A 10/14/2021    Procedure: COLONOSCOPY, WITH CO2 INSUFFLATION;  Surgeon: Carlos Manuel Perez MD;  Location: [facility] OR     LAPAROTOMY MINI, TUBAL LIGATION, COMBINED       OTHER SURGICAL HISTORY      cyst on ears      Family History   Problem Relation Age of Onset     No Known Problems Mother      Hypertension Father      Diabetes Type 2  Sister      Schizophrenia Brother         ?schizophrenia, better on meds     Arrhythmia Son         sudden cardiac arrest     Hypertension Sister      Diabetes Brother      Diabetes Brother        Objective  /79   Wt 124.7 kg (275 lb)   BMI 43.68 kg/m        General: healthy, alert and in no  acute distress      HEENT: no scleral icterus or conjunctival erythema     Skin: no suspicious lesions or rash. No jaundice.     CV: regular rhythm by palpation, 2+ distal pulses, no pedal edema      Resp: normal respiratory effort without conversational dyspnea     Psych: normal mood and affect      Gait: nonantalgic, appropriate coordination and balance     Neuro:        - Sensation to light touch:  Intact throughout the BLE including all peripheral nerve distributions.        - MSR:  2+ in bilateral patella, achilles.        - Special tests:   - Slump/SLR:  Neg bilaterally    MSK - Knee:       - Inspection:    -Mild effusion present on the right without significant erythema, warmth, ecchymosis, lesion.       - ROM:    - Full AROM/PROM of the right knee with crepitus and pain on the/extension.       - Palpation:    - TTP at the medial and lateral joint lines.  - NTTP elsewhere.        - Strength:    - 5/5 in BLE including HF, Hab, Hadd, KF, KE, DF, PF, EHL, Inv, Ev.        - Special tests:        - Lachman:   Neg        - A/P drawer:   Neg       - Pivot shift:   Neg   - Joan:   Neg     - Varus stress:   Neg for laxity or pain    - Valgus stress:   Neg for laxity or pain   - Patellar grind:  Pos    Radiology  I independently reviewed the available relevant imaging, with the following interpretation:   - XR R knee shows normal anatomic alignment with moderate degenerative changes in the medial and patellofemoral compartments, small knee joint effusion, no acute fractures or dislocations.    Recent Results (from the past 744 hour(s))   XR Knee Right 3 Views    Narrative    EXAM: KNEE RIGHT THREE VIEWS  DATE/TIME: 1/26/2023 9:36 AM    INDICATION: Acute pain of right knee.  COMPARISON: None available.       Impression    IMPRESSION: Anatomic alignment right knee. No acute displaced right  knee fracture. Mild-moderate medial and patellofemoral compartment  right knee osteoarthritis. Small right knee joint effusion.  No  significant anterior knee soft tissue swelling.        VALERIA ANDERSON MD         SYSTEM ID:  [id number]         Assessment  1. Primary osteoarthritis of right knee        Plan  Jerrica Pappas is a 52 year old female that presents with acute on chronic right knee pain. History and physical exam appear most consistent with primary osteoarthritis. Discussed the nature of the condition and treatment options and mutually agreed upon the following plan:    - Imaging:         - Reviewed relevant imaging in the chart. Results above. Reviewed imaging with patient.   - Medications:         - Discussed pharmacologic options for pain relief. May use NSAIDs as needed for pain control. May also use topical creams such as IcyHot, BioFreeze, or Voltaren gel PRN.   - Injections:         - Discussed injection options that could include corticosteroid, viscosupplementation, PRP.  Not interested at this time, will consider in the future as needed.  - Therapy:         - Discussed the benefits of PT vs HEP. Patient prefers HEP, exercise program given today in clinic.   - Modalities:         - May use ice, heat, massage or other modalities PRN.   - Bracing:         - Discussed bracing options and recommend using a patellar stabilizing brace (lateral J).  Brace was given in clinic and instructed to wear during exacerbating activities.  - Activity:         - Encouraged to remain active and participate in regular activities as symptoms allow.  - Follow up:         - In 6 weeks as needed for re-evaluation and update to treatment plan. Patient has clinic contact information for questions or concerns.       Raymond Medina DO  [facility] - Sports Medicine  [facility] - Department of Orthopedic Surgery

## 2023-02-08 ENCOUNTER — OFFICE VISIT (OUTPATIENT)
Dept: ORTHOPEDICS | Facility: OTHER | Age: 53
End: 2023-02-08
Payer: COMMERCIAL

## 2023-02-08 VITALS — WEIGHT: 275 LBS | SYSTOLIC BLOOD PRESSURE: 120 MMHG | BODY MASS INDEX: 43.68 KG/M2 | DIASTOLIC BLOOD PRESSURE: 79 MMHG

## 2023-02-08 DIAGNOSIS — M17.11 PRIMARY OSTEOARTHRITIS OF RIGHT KNEE: Primary | ICD-10-CM

## 2023-02-08 PROCEDURE — 99204 OFFICE O/P NEW MOD 45 MIN: CPT | Performed by: STUDENT IN AN ORGANIZED HEALTH CARE EDUCATION/TRAINING PROGRAM

## 2023-02-08 ASSESSMENT — PAIN SCALES - GENERAL: PAINLEVEL: MILD PAIN (3)

## 2023-02-08 NOTE — LETTER
2023         RE: Jerrica Pappas  [address] [place] [address]        Dear Colleague,    Thank you for referring your patient, Jerrica Pappas, to the [facility] [place]. Please see a copy of my visit note below.    Jerrica Pappas  :  1970  DOS: 2023  MRN: [id number]  PCP: Renetta Wheeler    Sports Medicine Clinic Visit    HPI  Jerrica Pappas is a 52 year old female who is seen in consultation at the request of Renetta Wheeler PA-C presenting with right knee pain.     She notes anterior knee pain that waxes and wanes for past few months, worsening over the past 6 weeks. Pain with walking and stairs. Notes that compression wrapping is helpful. Tylenol and ibuprofen have been minimally helpful. Is walking with a limp today. Denies numbness, tingling, grinding and cracking, mechanical locking symptoms. Endorses a burning-type pain at night occasionally.  Has not tried therapy, bracing, injections.    Social History: currently employed as a nurse    Review of Systems  Musculoskeletal: as above  Remainder of review of systems is negative including constitutional, CV, pulmonary, GI, Skin and Neurologic except as noted in HPI or medical history.    Past Medical History:   Diagnosis Date     Thyroid disorder      Past Surgical History:   Procedure Laterality Date     COLONOSCOPY N/A 10/14/2021    Procedure: Colonoscopy, Flexible, With Lesion Removal Using Snare;  Surgeon: Carlos Manuel Perez MD;  Location: [facility] OR     COLONOSCOPY WITH CO2 INSUFFLATION N/A 10/14/2021    Procedure: COLONOSCOPY, WITH CO2 INSUFFLATION;  Surgeon: Carlos Manuel Perez MD;  Location: [facility] OR     LAPAROTOMY MINI, TUBAL LIGATION, COMBINED       OTHER SURGICAL HISTORY      cyst on ears      Family History   Problem Relation Age of Onset     No Known Problems Mother      Hypertension Father      Diabetes Type 2  Sister      Schizophrenia Brother          ?schizophrenia, better on meds     Arrhythmia Son         sudden cardiac arrest     Hypertension Sister      Diabetes Brother      Diabetes Brother        Objective  /79   Wt 124.7 kg (275 lb)   BMI 43.68 kg/m        General: healthy, alert and in no acute distress      HEENT: no scleral icterus or conjunctival erythema     Skin: no suspicious lesions or rash. No jaundice.     CV: regular rhythm by palpation, 2+ distal pulses, no pedal edema      Resp: normal respiratory effort without conversational dyspnea     Psych: normal mood and affect      Gait: nonantalgic, appropriate coordination and balance     Neuro:        - Sensation to light touch:  Intact throughout the BLE including all peripheral nerve distributions.        - MSR:  2+ in bilateral patella, achilles.        - Special tests:   - Slump/SLR:  Neg bilaterally    MSK - Knee:       - Inspection:    -Mild effusion present on the right without significant erythema, warmth, ecchymosis, lesion.       - ROM:    - Full AROM/PROM of the right knee with crepitus and pain on the/extension.       - Palpation:    - TTP at the medial and lateral joint lines.  - NTTP elsewhere.        - Strength:    - 5/5 in BLE including HF, Hab, Hadd, KF, KE, DF, PF, EHL, Inv, Ev.        - Special tests:        - Lachman:   Neg        - A/P drawer:   Neg       - Pivot shift:   Neg   - Joan:   Neg     - Varus stress:   Neg for laxity or pain    - Valgus stress:   Neg for laxity or pain   - Patellar grind:  Pos    Radiology  I independently reviewed the available relevant imaging, with the following interpretation:   - XR R knee shows normal anatomic alignment with moderate degenerative changes in the medial and patellofemoral compartments, small knee joint effusion, no acute fractures or dislocations.    Recent Results (from the past 744 hour(s))   XR Knee Right 3 Views    Narrative    EXAM: KNEE RIGHT THREE VIEWS  DATE/TIME: 1/26/2023 9:36 AM    INDICATION: Acute pain of  right knee.  COMPARISON: None available.       Impression    IMPRESSION: Anatomic alignment right knee. No acute displaced right  knee fracture. Mild-moderate medial and patellofemoral compartment  right knee osteoarthritis. Small right knee joint effusion. No  significant anterior knee soft tissue swelling.        VALERIA ANDERSON MD         SYSTEM ID:  [id number]         Assessment  1. Primary osteoarthritis of right knee        Plan  Jerrica Pappas is a 52 year old female that presents with acute on chronic right knee pain. History and physical exam appear most consistent with primary osteoarthritis. Discussed the nature of the condition and treatment options and mutually agreed upon the following plan:    - Imaging:         - Reviewed relevant imaging in the chart. Results above. Reviewed imaging with patient.   - Medications:         - Discussed pharmacologic options for pain relief. May use NSAIDs as needed for pain control. May also use topical creams such as IcyHot, BioFreeze, or Voltaren gel PRN.   - Injections:         - Discussed injection options that could include corticosteroid, viscosupplementation, PRP.  Not interested at this time, will consider in the future as needed.  - Therapy:         - Discussed the benefits of PT vs HEP. Patient prefers HEP, exercise program given today in clinic.   - Modalities:         - May use ice, heat, massage or other modalities PRN.   - Bracing:         - Discussed bracing options and recommend using a patellar stabilizing brace (lateral J).  Brace was given in clinic and instructed to wear during exacerbating activities.  - Activity:         - Encouraged to remain active and participate in regular activities as symptoms allow.  - Follow up:         - In 6 weeks as needed for re-evaluation and update to treatment plan. Patient has clinic contact information for questions or concerns.       Raymond Medina DO  [facility] - Sports Medicine  [facility]  Physicians - Department of Orthopedic Surgery         Again, thank you for allowing me to participate in the care of your patient.        Sincerely,        Raymond Medina, DO

## 2023-02-08 NOTE — PATIENT INSTRUCTIONS
Pritesh Pappas ,     You were seen today for your right knee pain secondary to primary osteoarthritis.   As discussed in clinic, our treatment plan will focus on inflammation and pain control, rest, activity modification, bracing, injections as needed, stretching/strengthening/stabilization of the knee joint complex.  You may use non-steroidal anti-inflammatory (NSAID) medications as needed for pain relief.  Ibuprofen and naproxen are good choices.  Tylenol is a good alternative.  You may try topical medications such as IcyHot, BioFreeze, Bengay, or Voltaren gel as well, which may help your symptoms.   Discussed the benefits of Physical Therapy or Home Exercise Program for flexibility, strengthening, and stabilization. Elected Home Exercise Program, please perform these exercises daily and when having exacerbations of your symptoms.   Discussed injection options that could include corticosteroid, viscosupplementation, PRP.  Not interested at this time, will consider in the future as needed.  Discussed bracing options and recommend using a patellar stabilizing brace with medial and lateral supports (lateral J). Options presented in clinic, you may choose to take our brace or purchase an equivalent brace from an outside source.  Follow up in 6 weeks as needed for re-evaluation and update to treatment plan.   Please call the clinic for any questions or concerns.    It was a pleasure seeing you today. Thank you for choosing [facility] for your care.     Sincerely,   Raymond Medina DO  [facility] - Sports Medicine  [facility] - Department of Orthopedic Surgery

## 2023-03-06 ENCOUNTER — OFFICE VISIT (OUTPATIENT)
Dept: FAMILY MEDICINE | Facility: OTHER | Age: 53
End: 2023-03-06
Payer: COMMERCIAL

## 2023-03-06 VITALS
HEART RATE: 73 BPM | DIASTOLIC BLOOD PRESSURE: 70 MMHG | BODY MASS INDEX: 44.84 KG/M2 | WEIGHT: 279 LBS | OXYGEN SATURATION: 98 % | RESPIRATION RATE: 18 BRPM | TEMPERATURE: 98.7 F | HEIGHT: 66 IN | SYSTOLIC BLOOD PRESSURE: 112 MMHG

## 2023-03-06 DIAGNOSIS — R73.03 PREDIABETES: Primary | ICD-10-CM

## 2023-03-06 DIAGNOSIS — E03.9 ACQUIRED HYPOTHYROIDISM: ICD-10-CM

## 2023-03-06 PROCEDURE — 99214 OFFICE O/P EST MOD 30 MIN: CPT | Performed by: PHYSICIAN ASSISTANT

## 2023-03-06 RX ORDER — METFORMIN HCL 500 MG
500 TABLET, EXTENDED RELEASE 24 HR ORAL
Qty: 90 TABLET | Refills: 1 | Status: SHIPPED | OUTPATIENT
Start: 2023-03-06 | End: 2024-02-13

## 2023-03-06 ASSESSMENT — PAIN SCALES - GENERAL: PAINLEVEL: NO PAIN (0)

## 2023-03-06 NOTE — PROGRESS NOTES
Assessment & Plan     ICD-10-CM    1. Prediabetes  R73.03 Hemoglobin A1c     metFORMIN (GLUCOPHAGE XR) 500 MG 24 hr tablet      2. Acquired hypothyroidism  E03.9 TSH with free T4 reflex        - Patient here to discuss recent lab testing     A1C at 6.0  - Discussed new diagnosis of pre-diabetes, discussed what A1C means      We discussed lifestyle changes including diet and exercise   - Discussed various medications including metformin, SGLT2, and GLP-1 that could help with weight loss and blood sugars   - She works in healthcare, so familiar with some      She would like to try the Metformin for now      Reviewed medication use and side effects   - Recheck 3 months with repeat A1C  - Thyroid      Was stable at labs in Nov but having symptoms that could be thyroid vs. Diabetes     Discussed how these two diagnosis often go hand in hand      Recommend recheck TSH at our follow up appointment   - Offered referral to nutrition/diabetes ed, patient declined at this time   - Hand outs given       Review of the result(s) of each unique test - See list        1/26/23 - A1C         11/21/22 - TSH   Diagnosis or treatment significantly limited by social determinants of health - none     20 minutes spent on the date of the encounter doing chart review, history and exam, documentation and further activities as noted above    The patient indicates understanding of these issues and agrees with the plan.    Return in about 3 months (around 6/6/2023) for Diabetes Recheck.    Renetta Aldana-LEXIE Bertrand [facility]    Shanda Becerril is a 52 year old, presenting for the following health issues:  Follow Up (Pre diabetes)      History of Present Illness       Diabetes:   She presents for follow up of diabetes.  She is not checking blood glucose. She has no concerns regarding her diabetes at this time.  She is having weight gain.         She eats 2-3 servings of fruits and vegetables daily.She  "consumes 1 sweetened beverage(s) daily.She exercises with enough effort to increase her heart rate 20 to 29 minutes per day.  She exercises with enough effort to increase her heart rate 3 or less days per week.   She is taking medications regularly.         Hypothyroidism Follow-up      Since last visit, patient describes the following symptoms: weight gain of 5-10 lbs, constipation and hair loss      Review of Systems   Constitutional, HEENT, cardiovascular, pulmonary, gi and gu systems are negative, except as otherwise noted.      Objective    /70   Pulse 73   Temp 98.7  F (37.1  C) (Temporal)   Resp 18   Ht 1.688 m (5' 6.46\")   Wt 126.6 kg (279 lb)   SpO2 98%   BMI 44.41 kg/m    There is no height or weight on file to calculate BMI.  Physical Exam   GENERAL APPEARANCE: healthy, alert and no distress  EYES: Eyes grossly normal to inspection, PERRLA, conjunctivae and sclerae without injection or discharge, EOM intact   RESP: Lungs clear to auscultation - no rales, rhonchi or wheezes    CV: Regular rates and rhythm, normal S1 S2, no S3 or S4, no murmur, click or rub, no peripheral edema and peripheral pulses strong and symmetric bilaterally   MS: No musculoskeletal defects are noted and gait is age appropriate without ataxia   SKIN: No suspicious lesions or rashes, hydration status appears adeuqate with normal skin turgor   PSYCH: Alert and oriented x3; speech- coherent , normal rate and volume; able to articulate logical thoughts, able to abstract reason, no tangential thoughts, no hallucinations or delusions, mentation appears normal, Mood is euthymic. Affect is appropriate for this mood state and bright. Thought content is free of suicidal ideation, hallucinations, and delusions. Dress is adequate and upkept. Eye contact is good during conversation.       Diagnostics: reviewed in Epic           "

## 2023-03-07 PROBLEM — R73.03 PREDIABETES: Status: ACTIVE | Noted: 2023-03-07

## 2024-02-07 DIAGNOSIS — E03.9 ACQUIRED HYPOTHYROIDISM: ICD-10-CM

## 2024-02-07 RX ORDER — LEVOTHYROXINE SODIUM 112 UG/1
112 TABLET ORAL DAILY
Qty: 30 TABLET | Refills: 0 | Status: SHIPPED | OUTPATIENT
Start: 2024-02-07 | End: 2024-04-12

## 2024-02-07 NOTE — TELEPHONE ENCOUNTER
Barbara refill sent but patient hasn't had labs in >1 year. Please assist in scheduling. Also due for her yearly recheck as of 3/6/24. So recommend labs first and then follow up with provider.     Cedrick Wheeler PA-C  [facility]

## 2024-02-07 NOTE — TELEPHONE ENCOUNTER
Called and spoke to patient she is scheduled for tomorrow at 12 for labs       Ghazal Torres, CMA

## 2024-02-08 ENCOUNTER — LAB (OUTPATIENT)
Dept: LAB | Facility: OTHER | Age: 54
End: 2024-02-08
Payer: COMMERCIAL

## 2024-02-08 DIAGNOSIS — R73.03 PREDIABETES: ICD-10-CM

## 2024-02-08 DIAGNOSIS — E03.9 ACQUIRED HYPOTHYROIDISM: ICD-10-CM

## 2024-02-08 LAB
HBA1C MFR BLD: 6 % (ref 0–5.6)
TSH SERPL DL<=0.005 MIU/L-ACNC: 1.99 UIU/ML (ref 0.3–4.2)

## 2024-02-08 PROCEDURE — 83036 HEMOGLOBIN GLYCOSYLATED A1C: CPT

## 2024-02-08 PROCEDURE — 84443 ASSAY THYROID STIM HORMONE: CPT

## 2024-02-08 PROCEDURE — 36415 COLL VENOUS BLD VENIPUNCTURE: CPT

## 2024-02-09 ENCOUNTER — TELEPHONE (OUTPATIENT)
Dept: FAMILY MEDICINE | Facility: OTHER | Age: 54
End: 2024-02-09
Payer: COMMERCIAL

## 2024-02-09 NOTE — RESULT ENCOUNTER NOTE
Please mail normal/stable labs.     Did labs but still will need yearly recheck in March.       Cedrick Wheeler PA-C

## 2024-02-12 DIAGNOSIS — R73.03 PREDIABETES: ICD-10-CM

## 2024-02-13 RX ORDER — METFORMIN HCL 500 MG
TABLET, EXTENDED RELEASE 24 HR ORAL
Qty: 30 TABLET | Refills: 0 | Status: SHIPPED | OUTPATIENT
Start: 2024-02-13 | End: 2024-04-19

## 2024-02-13 NOTE — TELEPHONE ENCOUNTER
Even though patient did come in for labs, she still needs a recheck appointment. Last was 3/6/23. Please assist in scheduling    Barbara refill provided.     Cedrick Wheeler PA-C  [facility] [address] [facility]

## 2024-02-14 NOTE — TELEPHONE ENCOUNTER
Attempted to call patient.  Left detailed message prescription was sent to pharmacy and to call back and schedule physical.

## 2024-04-12 DIAGNOSIS — E03.9 ACQUIRED HYPOTHYROIDISM: ICD-10-CM

## 2024-04-12 NOTE — TELEPHONE ENCOUNTER
Medication Question or Refill        What medication are you calling about (include dose and sig)?:     levothyroxine (SYNTHROID/LEVOTHROID) 112 MCG tablet       Preferred Pharmacy:     [facility] [address] [address] [address]  [address] [facility] [address]  Phone: [phone] Fax: [phone]      Controlled Substance Agreement on file:   CSA -- Patient Level:    CSA: None found at the patient level.       Who prescribed the medication?: Katya    Do you need a refill? Yes    When did you use the medication last? 04/12    Patient offered an appointment? Yes: Set up for May but will run out     Do you have any questions or concerns?  Yes: will run out next week      Okay to leave a detailed message?: Yes at Cell number on file:    Telephone Information:   Mobile [phone]

## 2024-04-15 RX ORDER — LEVOTHYROXINE SODIUM 112 UG/1
112 TABLET ORAL DAILY
Qty: 30 TABLET | Refills: 0 | Status: SHIPPED | OUTPATIENT
Start: 2024-04-15 | End: 2024-05-13

## 2024-04-18 DIAGNOSIS — R73.03 PREDIABETES: ICD-10-CM

## 2024-04-19 RX ORDER — METFORMIN HCL 500 MG
500 TABLET, EXTENDED RELEASE 24 HR ORAL
Qty: 30 TABLET | Refills: 0 | Status: SHIPPED | OUTPATIENT
Start: 2024-04-19 | End: 2024-05-13

## 2024-05-13 ENCOUNTER — OFFICE VISIT (OUTPATIENT)
Dept: FAMILY MEDICINE | Facility: OTHER | Age: 54
End: 2024-05-13
Payer: COMMERCIAL

## 2024-05-13 VITALS
SYSTOLIC BLOOD PRESSURE: 108 MMHG | RESPIRATION RATE: 20 BRPM | DIASTOLIC BLOOD PRESSURE: 70 MMHG | HEIGHT: 66 IN | OXYGEN SATURATION: 93 % | BODY MASS INDEX: 43.39 KG/M2 | HEART RATE: 77 BPM | WEIGHT: 270 LBS | TEMPERATURE: 97.5 F

## 2024-05-13 DIAGNOSIS — Z12.31 VISIT FOR SCREENING MAMMOGRAM: ICD-10-CM

## 2024-05-13 DIAGNOSIS — Z00.00 ROUTINE GENERAL MEDICAL EXAMINATION AT A HEALTH CARE FACILITY: Primary | ICD-10-CM

## 2024-05-13 DIAGNOSIS — E03.9 ACQUIRED HYPOTHYROIDISM: ICD-10-CM

## 2024-05-13 DIAGNOSIS — R73.03 PREDIABETES: ICD-10-CM

## 2024-05-13 PROCEDURE — 99396 PREV VISIT EST AGE 40-64: CPT | Performed by: PHYSICIAN ASSISTANT

## 2024-05-13 RX ORDER — METFORMIN HCL 500 MG
500 TABLET, EXTENDED RELEASE 24 HR ORAL
Qty: 90 TABLET | Refills: 3 | Status: SHIPPED | OUTPATIENT
Start: 2024-05-13

## 2024-05-13 RX ORDER — LEVOTHYROXINE SODIUM 112 UG/1
112 TABLET ORAL DAILY
Qty: 90 TABLET | Refills: 3 | Status: SHIPPED | OUTPATIENT
Start: 2024-05-13

## 2024-05-13 SDOH — HEALTH STABILITY: PHYSICAL HEALTH: ON AVERAGE, HOW MANY DAYS PER WEEK DO YOU ENGAGE IN MODERATE TO STRENUOUS EXERCISE (LIKE A BRISK WALK)?: 2 DAYS

## 2024-05-13 SDOH — HEALTH STABILITY: PHYSICAL HEALTH: ON AVERAGE, HOW MANY MINUTES DO YOU ENGAGE IN EXERCISE AT THIS LEVEL?: 20 MIN

## 2024-05-13 ASSESSMENT — PAIN SCALES - GENERAL: PAINLEVEL: NO PAIN (0)

## 2024-05-13 ASSESSMENT — SOCIAL DETERMINANTS OF HEALTH (SDOH): HOW OFTEN DO YOU GET TOGETHER WITH FRIENDS OR RELATIVES?: ONCE A WEEK

## 2024-05-13 NOTE — PROGRESS NOTES
"Preventive Care Visit  [facility]  Renetta Wheeler PA-C, Family Medicine  May 13, 2024    Assessment & Plan     ICD-10-CM    1. Routine general medical examination at a health care facility  Z00.00       2. Prediabetes  R73.03 metFORMIN (GLUCOPHAGE XR) 500 MG 24 hr tablet      3. Acquired hypothyroidism  E03.9 levothyroxine (SYNTHROID/LEVOTHROID) 112 MCG tablet      4. Visit for screening mammogram  Z12.31 MA SCREENING DIGITAL BILAT - Future  (s+30)        1 & 4. Patient was seen today for her annual physical exam. She was counseled on available and due immunizations. She has declined any vaccinations at this time. She Would like a referral for a mammogram. This has been ordered. She has no other concerns at this time. She has had recent labs and is not due for any at the time of visit. A breast exam was offered and completed.     2. Patient is prediabetic. She had an A1C lab completed 02/08/2024. This showed her prediabetic status is stable at 6.0% for the last year. Will continue to monitor.  We have refilled her metformin and this has been sent to the pharmacy.     3. Patient has hypothyroidism. She had a TSH with free T4 completed 02/08/2024. This returned within normal range at 1.99. She has no current concerns regarding her hypothyroidism and denies any new symptoms. We have refilled her levothyroxine and sent this to the pharmacy. Will continue to monitor.    Patient has been advised of split billing requirements and indicates understanding: Yes    BMI  Estimated body mass index is 43.29 kg/m  as calculated from the following:    Height as of this encounter: 1.682 m (5' 6.22\").    Weight as of this encounter: 122.5 kg (270 lb).   Weight management plan: Discussed healthy diet and exercise guidelines    Counseling  Appropriate preventive services were discussed with this patient, including applicable screening as appropriate for fall prevention, nutrition, physical " activity, Tobacco-use cessation, weight loss and cognition.  Checklist reviewing preventive services available has been given to the patient.  Reviewed patient's diet, addressing concerns and/or questions.   She is at risk for lack of exercise and has been provided with information to increase physical activity for the benefit of her well-being.       Review of the result(s) of each unique test - See list          2/8/24 - A1C & TSH        1/26/23 - all labs       12/1/2020 - mammogram        10/14/21 - colonoscopy  Diagnosis or treatment significantly limited by social determinants of health - None     25 minutes spent on the date of the encounter doing chart review, history and exam, documentation and further activities as noted above    The patient indicates understanding of these issues and agrees with the plan.    Follow up: 1 year or PRN     I, Cedrick Wheeler PA-C,  was present with the PA student who participated in the service and in the documentation of the note.  I have verified the history and personally performed the physical exam and medical decision making.  I agree with the assessment and plan of care as documented in the note.     KRYSTLE AllanS   [facility]     Cedrick Wheeler PA-C  [facility]       Shanda Becerril is a 53 year old, presenting for the following:  No chief complaint on file.        5/13/2024     9:50 AM   Additional Questions   Roomed by Katya   Accompanied by Self         5/13/2024     9:50 AM   Patient Reported Additional Medications   Patient reports taking the following new medications NA        Health Care Directive  Patient does not have a Health Care Directive or Living Will: Discussed advance care planning with patient; however, patient declined at this time.    HPI    Patient is here today for annual visit. She would like a referral for her mammogram. She has no concerns with her health or medications.      Pre-Diabetes Follow-up    How often are you checking your blood sugar? Not at all  What concerns do you have today about your diabetes? None   Do you have any of these symptoms? (Select all that apply)  No numbness or tingling in feet.  No redness, sores or blisters on feet.  No complaints of excessive thirst.  No reports of blurry vision.  No significant changes to weight.      BP Readings from Last 2 Encounters:   05/13/24 108/70   03/06/23 112/70     Hemoglobin A1C (%)   Date Value   02/08/2024 6.0 (H)   01/26/2023 6.0 (H)   01/25/2018 5.9     LDL Cholesterol Calculated (mg/dL)   Date Value   11/21/2022 70   11/11/2021 68   11/10/2020 63   01/25/2018 61             Hypothyroidism Follow-up    Since last visit, patient describes the following symptoms: Weight stable, no hair loss, no skin changes, no constipation, no loose stools        5/13/2024   General Health   How would you rate your overall physical health? Good   Feel stress (tense, anxious, or unable to sleep) Not at all         5/13/2024   Nutrition   Three or more servings of calcium each day? Yes   Diet: Regular (no restrictions)   How many servings of fruit and vegetables per day? 4 or more   How many sweetened beverages each day? 0-1         5/13/2024   Exercise   Days per week of moderate/strenous exercise 2 days   Average minutes spent exercising at this level 20 min   (!) EXERCISE CONCERN      5/13/2024   Social Factors   Frequency of gathering with friends or relatives Once a week   Worry food won't last until get money to buy more No   Food not last or not have enough money for food? No   Do you have housing?  Yes   Are you worried about losing your housing? No   Lack of transportation? No   Unable to get utilities (heat,electricity)? No         5/13/2024   Fall Risk   Fallen 2 or more times in the past year? No   Trouble with walking or balance? No          5/13/2024   Dental   Dentist two times every year? Yes            Today's PHQ-2  Score:       5/13/2024     9:24 AM   PHQ-2 ( 1999 [employer])   Q1: Little interest or pleasure in doing things 0   Q2: Feeling down, depressed or hopeless 0   PHQ-2 Score 0   Q1: Little interest or pleasure in doing things Not at all   Q2: Feeling down, depressed or hopeless Not at all   PHQ-2 Score 0           5/13/2024   Substance Use   Alcohol more than 3/day or more than 7/wk Not Applicable   Do you use any other substances recreationally? No     Social History     Tobacco Use    Smoking status: Never    Smokeless tobacco: Never   Vaping Use    Vaping status: Never Used   Substance Use Topics    Alcohol use: No    Drug use: No        Mammogram Screening - Mammogram every 1-2 years updated in Health Maintenance based on mutual decision making        5/13/2024   STI Screening   New sexual partner(s) since last STI/HIV test? No     History of abnormal Pap smear: NO - age 30-65 PAP every 5 years with negative HPV co-testing recommended        Latest Ref Rng & Units 1/26/2023     9:00 AM 1/25/2018     9:50 AM 1/25/2018     9:36 AM   PAP / HPV   PAP  Negative for Intraepithelial Lesion or Malignancy (NILM)      PAP (Historical)    NIL    HPV 16 DNA Negative Negative  Negative     HPV 18 DNA Negative Negative  Negative     Other HR HPV Negative Negative  Negative       ASCVD Risk   The ASCVD Risk score (Yeimi DK, et al., 2019) failed to calculate for the following reasons:    The valid total cholesterol range is 130 to 320 mg/dL      Reviewed and updated as needed this visit by Provider   Tobacco  Allergies  Meds  Problems  Med Hx  Surg Hx  Fam Hx            Past Medical History:   Diagnosis Date    Thyroid disorder      Past Surgical History:   Procedure Laterality Date    COLONOSCOPY N/A 10/14/2021    Procedure: Colonoscopy, Flexible, With Lesion Removal Using Snare;  Surgeon: Carlos Manuel Perez MD;  Location: [facility] OR    COLONOSCOPY WITH CO2 INSUFFLATION N/A 10/14/2021    Procedure: COLONOSCOPY,  "WITH CO2 INSUFFLATION;  Surgeon: Carlos Manuel Perez MD;  Location: [facility] OR    LAPAROTOMY MINI, TUBAL LIGATION, COMBINED      OTHER SURGICAL HISTORY      cyst on ears      Lab work is in process  Labs reviewed in EPIC  BP Readings from Last 3 Encounters:   05/13/24 108/70   03/06/23 112/70   02/08/23 120/79    Wt Readings from Last 3 Encounters:   05/13/24 122.5 kg (270 lb)   03/06/23 126.6 kg (279 lb)   02/08/23 124.7 kg (275 lb)                      Review of Systems  CONSTITUTIONAL: NEGATIVE for fever, chills, change in weight  ENT/MOUTH: NEGATIVE for ear, mouth and throat problems  RESP: NEGATIVE for significant cough or SOB  CV: NEGATIVE for chest pain, palpitations or peripheral edema  PSYCHIATRIC: NEGATIVE for changes in mood or affect  ROS otherwise negative     Objective    Exam  /70   Pulse 77   Temp 97.5  F (36.4  C) (Temporal)   Resp 20   Ht 1.682 m (5' 6.22\")   Wt 122.5 kg (270 lb)   SpO2 93%   BMI 43.29 kg/m     Estimated body mass index is 43.29 kg/m  as calculated from the following:    Height as of this encounter: 1.682 m (5' 6.22\").    Weight as of this encounter: 122.5 kg (270 lb).    Physical Exam  Vitals reviewed. Exam conducted with a chaperone present.   Constitutional:       General: She is not in acute distress.     Appearance: Normal appearance. She is not ill-appearing, toxic-appearing or diaphoretic.   HENT:      Head: Normocephalic and atraumatic.      Right Ear: Tympanic membrane, ear canal and external ear normal.      Left Ear: Tympanic membrane, ear canal and external ear normal.      Nose: Nose normal. No congestion or rhinorrhea.      Mouth/Throat:      Mouth: Mucous membranes are moist.      Pharynx: Oropharynx is clear. No oropharyngeal exudate or posterior oropharyngeal erythema.   Eyes:      General: No scleral icterus.     Extraocular Movements: Extraocular movements intact.      Conjunctiva/sclera: Conjunctivae normal.      Pupils: Pupils are equal, round, " and reactive to light.   Cardiovascular:      Rate and Rhythm: Normal rate and regular rhythm.      Pulses: Normal pulses.      Heart sounds: Normal heart sounds. No murmur heard.  Pulmonary:      Effort: Pulmonary effort is normal. No respiratory distress.      Breath sounds: Normal breath sounds. No wheezing.   Chest:      Chest wall: No mass or tenderness.   Breasts:     Right: Normal. No mass, nipple discharge, skin change or tenderness.      Left: Normal. No mass, nipple discharge, skin change or tenderness.   Abdominal:      General: Abdomen is flat. Bowel sounds are normal. There is no distension.      Palpations: Abdomen is soft.      Tenderness: There is no abdominal tenderness. There is no guarding or rebound.      Hernia: No hernia is present.   Musculoskeletal:         General: Normal range of motion.      Cervical back: Normal range of motion and neck supple.   Skin:     General: Skin is warm and dry.      Coloration: Skin is not jaundiced or pale.   Neurological:      General: No focal deficit present.      Mental Status: She is alert and oriented to person, place, and time.      Cranial Nerves: No cranial nerve deficit.      Motor: No weakness.      Coordination: Coordination normal.      Gait: Gait normal.   Psychiatric:         Mood and Affect: Mood normal.         Behavior: Behavior normal.         Thought Content: Thought content normal.     Pelvic not indicated     Diagnostics: reviewed in Epic       Signed Electronically by: Renetta Wheeler PA-C

## 2024-05-13 NOTE — PATIENT INSTRUCTIONS
"Preventive Care Advice   This is general advice we often give to help people stay healthy. Your care team may have specific advice just for you. Please talk to your care team about your own preventive care needs.  Lifestyle  Exercise at least 150 minutes each week (30 minutes a day, 5 days a week).  Do muscle strengthening activities 2 days a week. These help control your weight and prevent disease.  No smoking.  Wear sunscreen to prevent skin cancer.  Have your home tested for radon every 2 to 5 years. Radon is a colorless, odorless gas that can harm your lungs. To learn more, go to www.health.[facility].mn. and search for \"Radon in Homes.\"  Keep guns unloaded and locked up in a safe place like a safe or gun vault, or, use a gun lock and hide the keys. Always lock away bullets separately. To learn more, visit [place].fm.[place].gov and search for \"safe gun storage.\"  Nutrition  Eat 5 or more servings of fruits and vegetables each day.  Try wheat bread, brown rice and whole grain pasta (instead of white bread, rice, and pasta).  Get enough calcium and vitamin D. Check the label on foods and aim for 100% of the RDA (recommended daily allowance).  Regular exams  Have a dental exam and cleaning every 6 months.  See your health care team every year to talk about:  Any changes in your health.  Any medicines your care team has prescribed.  Preventive care, family planning, and ways to prevent chronic diseases.  Shots (vaccines)   HPV shots (up to age 26), if you've never had them before.  Hepatitis B shots (up to age 59), if you've never had them before.  COVID-19 shot: Get this shot when it's due.  Flu shot: Get a flu shot every year.  Tetanus shot: Get a tetanus shot every 10 years.  Pneumococcal, hepatitis A, and RSV shots: Ask your care team if you need these based on your risk.  Shingles shot (for age 50 and up).  General health tests  Diabetes screening:  Starting at age 35, Get screened for diabetes at least every 3 years.  If " you are younger than age 35, ask your care team if you should be screened for diabetes.  Cholesterol test: At age 39, start having a cholesterol test every 5 years, or more often if advised.  Bone density scan (DEXA): At age 50, ask your care team if you should have this scan for osteoporosis (brittle bones).  Hepatitis C: Get tested at least once in your life.  Abdominal aortic aneurysm screening: Talk to your doctor about having this screening if you:  Have ever smoked; and  Are biologically male; and  Are between the ages of 65 and 75.  STIs (sexually transmitted infections)  Before age 24: Ask your care team if you should be screened for STIs.  After age 24: Get screened for STIs if you're at risk. You are at risk for STIs (including HIV) if:  You are sexually active with more than one person.  You don't use condoms every time.  You or a partner was diagnosed with a sexually transmitted infection.  If you are at risk for HIV, ask about PrEP medicine to prevent HIV.  Get tested for HIV at least once in your life, whether you are at risk for HIV or not.  Cancer screening tests  Cervical cancer screening: If you have a cervix, begin getting regular cervical cancer screening tests at age 21. Most people who have regular screenings with normal results can stop after age 65. Talk about this with your provider.  Breast cancer scan (mammogram): If you've ever had breasts, begin having regular mammograms starting at age 40. This is a scan to check for breast cancer.  Colon cancer screening: It is important to start screening for colon cancer at age 45.  Have a colonoscopy test every 10 years (or more often if you're at risk) Or, ask your provider about stool tests like a FIT test every year or Cologuard test every 3 years.  To learn more about your testing options, visit: www.[place]/795064.pdf.  For help making a decision, visit: vincent/wn69759.  Prostate cancer screening test: If you have a prostate and are age 55  to 69, ask your provider if you would benefit from a yearly prostate cancer screening test.  Lung cancer screening: If you are a current or former smoker age 50 to 80, ask your care team if ongoing lung cancer screenings are right for you.  For informational purposes only. Not to replace the advice of your health care provider. Copyright   2023 [employer]. All rights reserved. Clinically reviewed by the [facility] Transitions Program. [employer] [id number] - REV 04/24.

## 2024-12-26 ENCOUNTER — PATIENT OUTREACH (OUTPATIENT)
Dept: CARE COORDINATION | Facility: CLINIC | Age: 54
End: 2024-12-26
Payer: COMMERCIAL

## 2025-03-27 ENCOUNTER — LAB (OUTPATIENT)
Dept: LAB | Facility: OTHER | Age: 55
End: 2025-03-27
Payer: COMMERCIAL

## 2025-03-27 ENCOUNTER — TELEPHONE (OUTPATIENT)
Dept: FAMILY MEDICINE | Facility: OTHER | Age: 55
End: 2025-03-27

## 2025-03-27 DIAGNOSIS — Z11.1 SCREENING EXAMINATION FOR PULMONARY TUBERCULOSIS: Primary | ICD-10-CM

## 2025-03-27 DIAGNOSIS — E03.9 ACQUIRED HYPOTHYROIDISM: Primary | ICD-10-CM

## 2025-03-27 DIAGNOSIS — Z11.1 SCREENING EXAMINATION FOR PULMONARY TUBERCULOSIS: ICD-10-CM

## 2025-03-27 LAB — TSH SERPL DL<=0.005 MIU/L-ACNC: 1.28 UIU/ML (ref 0.3–4.2)

## 2025-03-27 NOTE — TELEPHONE ENCOUNTER
Order placed    Cedrick Wheeler PA-C  [facility] [place] [facility]

## 2025-03-27 NOTE — TELEPHONE ENCOUNTER
Patient was moved from MA schedule to lab schedule today for TB gold test for work. She does not have any orders, are you willing to place this order for her she is coming at 840 AM.    Cassandra Killian, CMA

## 2025-04-07 NOTE — MR AVS SNAPSHOT
After Visit Summary   4/18/2017    Jerrica Pappas    MRN: [id number]           Patient Information     Date Of Birth          1970        Visit Information        Provider Department      4/18/2017 11:00 AM Renetta Wheeler PA-C [facility]        Today's Diagnoses     Acute bronchitis with symptoms > 10 days    -  1    [place]          Care Instructions                       Acute Bronchitis                  What is acute bronchitis?   Bronchitis is an infection of the air passages--that is, the tubes that connect the windpipe to the lungs. It causes swelling and irritation of the airways. With acute bronchitis you usually have a cough that produces phlegm and pain behind the breastbone when you breathe deeply or cough.   How does it occur?   Bronchitis often occurs with viral infections of the respiratory tract, such as colds and flu. Bronchitis may also be caused by bacterial infections. It may occur with childhood illnesses such as measles and whooping cough.   Attacks are most frequent during the winter or when the level of air pollution is high.   Infants, young children, older adults, smokers, and people with heart disease or lung disease (including asthma and allergies) are most likely to get acute bronchitis.   What are the symptoms?   Symptoms may include:   a deep cough that produces yellowish or greenish phlegm   pain behind the breastbone when you breathe deeply or cough   wheezing   feeling short of breath   fever   chills   headache   sore muscles.   How is it diagnosed?   Your healthcare provider will ask about your symptoms and examine you. You may have tests, such as:   a test of phlegm to look for bacteria   chest X-ray   blood tests.   How is it treated?   Acute bronchitis often does not require medical treatment. Resting at home and drinking plenty of fluids to keep the mucus loose may be all you need to do to get better in a few days. If your  symptoms are severe or you have other health problems (such as heart or lung disease or diabetes), you may need to take antibiotics.   How long will the effects last?   Most of the time acute bronchitis clears up in a few days. Your cough may slowly get better in 1 to 2 weeks.   It may take you longer to recover if:   You are a smoker.   You live in an area where air pollution is a problem.   You have a heart or lung disease.   You have any other continuing health problems.   How can I take care of myself?   You can help yourself by:   following the full treatment your healthcare provider recommends   using a vaporizer, humidifier, or steam from hot water to add moisture to the air   drinking plenty of liquids   taking cough medicine if recommended by your healthcare provider   resting in bed   taking aspirin or acetaminophen to reduce fever and relieve headache and muscle pain (Check with your healthcare provider before you give any medicine that contains aspirin or salicylates to a child or teen. This includes medicines like baby aspirin, some cold medicines, and Pepto Bismol. Children and teens who take aspirin are at risk for a serious illness called Reye's syndrome.)   eating healthy meals.   Call your healthcare provider if:   You have trouble breathing.   You have a fever of 101.5?F (38.6?C) or higher.   You cough up blood.   Your symptoms are getting worse instead of better.   You don't start to feel better after 3 days of treatment.   You have any symptoms that concern you.   How can I help prevent acute bronchitis?   To reduce your risk of getting a respiratory infection:   Do not smoke.   Wash your hands often, especially when you are around people with colds (upper respiratory infections).   If you have asthma or allergies, keep your symptoms under good control.   Get regular exercise.   Eat healthy foods.       Published by [employer].  This content is reviewed periodically and is subject to change as  "new health information becomes available. The information is intended to inform and educate and is not a replacement for medical evaluation, advice, diagnosis or treatment by a healthcare professional.   Developed by [employer].   ? 2010 [facility] and/or its affiliates. All Rights Reserved.   Copyright   Clinical Reference Systems                 Follow-ups after your visit        Who to contact     If you have questions or need follow up information about today's clinic visit or your schedule please contact [facility] directly at [phone].  Normal or non-critical lab and imaging results will be communicated to you by MyChart, letter or phone within 4 business days after the clinic has received the results. If you do not hear from us within 7 days, please contact the clinic through MyChart or phone. If you have a critical or abnormal lab result, we will notify you by phone as soon as possible.  Submit refill requests through [employer] or call your pharmacy and they will forward the refill request to us. Please allow 3 business days for your refill to be completed.          Additional Information About Your Visit        [employer] Information     [employer] lets you send messages to your doctor, view your test results, renew your prescriptions, schedule appointments and more. To sign up, go to [email or web address] . Click on \"Log in\" on the left side of the screen, which will take you to the Welcome page. Then click on \"Sign up Now\" on the right side of the page.     You will be asked to enter the access code listed below, as well as some personal information. Please follow the directions to create your username and password.     Your access code is: [email or web address]  Expires: 2017 11:33 AM     Your access code will  in 90 days. If you need help or a new code, please call your [facility] or [phone].        Care EveryWhere ID     This is your Care EveryWhere ID. This could be " "used by other organizations to access your [place] medical records  [id number]        Your Vitals Were     Pulse Temperature Respirations Height Pulse Oximetry BMI (Body Mass Index)    90 100.1  F (37.8  C) (Oral) 20 5' 6.85\" (1.698 m) 97% 41.69 kg/m2       Blood Pressure from Last 3 Encounters:   04/18/17 110/78   12/14/16 118/70   11/28/16 118/70    Weight from Last 3 Encounters:   04/18/17 265 lb (120.2 kg)   12/14/16 264 lb (119.7 kg)   11/28/16 266 lb 6.4 oz (120.8 kg)              We Performed the Following     Influenza A/B antigen          Today's Medication Changes          These changes are accurate as of: 4/18/17 11:34 AM.  If you have any questions, ask your nurse or doctor.               Start taking these medicines.        Dose/Directions    azithromycin 250 MG tablet   Commonly known as:  ZITHROMAX   Used for:  Acute bronchitis with symptoms > 10 days   Started by:  Renetta Wheeler PA-C        Two tablets first day, then one tablet daily for four days.   Quantity:  6 tablet   Refills:  0       benzonatate 200 MG capsule   Commonly known as:  TESSALON   Used for:  Acute bronchitis with symptoms > 10 days   Started by:  Renetta Wheeler PA-C        Dose:  200 mg   Take 1 capsule (200 mg) by mouth 3 times daily as needed for cough   Quantity:  30 capsule   Refills:  0            Where to get your medicines      These medications were sent to [facility] [address] [place] [address] [facility] [address]  [address] [facility], [facility] [address]     Phone:  [phone]     azithromycin 250 MG tablet    benzonatate 200 MG capsule                Primary Care Provider Office Phone # Fax #    Renetta Wheeler PA-C [phone]       [address]   [facility] [address]        Thank you!     Thank you for choosing [facility]  for your care. Our goal is always to provide " you with excellent care. Hearing back from our patients is one way we can continue to improve our services. Please take a few minutes to complete the written survey that you may receive in the mail after your visit with us. Thank you!             Your Updated Medication List - Protect others around you: Learn how to safely use, store and throw away your medicines at www.disposemymeds.org.          This list is accurate as of: 4/18/17 11:34 AM.  Always use your most recent med list.                   Brand Name Dispense Instructions for use    azithromycin 250 MG tablet    ZITHROMAX    6 tablet    Two tablets first day, then one tablet daily for four days.       benzonatate 200 MG capsule    TESSALON    30 capsule    Take 1 capsule (200 mg) by mouth 3 times daily as needed for cough       levothyroxine 112 MCG tablet    SYNTHROID/LEVOTHROID    90 tablet    Take 1 tablet (112 mcg) by mouth daily       miconazole 2 % powder    MICATIN; MICRO GUARD    90 g    Apply topically 2 times daily          There are no Wet Read(s) to document.

## 2025-04-14 ENCOUNTER — PATIENT OUTREACH (OUTPATIENT)
Dept: CARE COORDINATION | Facility: CLINIC | Age: 55
End: 2025-04-14
Payer: COMMERCIAL

## 2025-04-28 ENCOUNTER — PATIENT OUTREACH (OUTPATIENT)
Dept: CARE COORDINATION | Facility: CLINIC | Age: 55
End: 2025-04-28
Payer: COMMERCIAL

## 2025-05-05 ENCOUNTER — OFFICE VISIT (OUTPATIENT)
Dept: OBGYN | Facility: OTHER | Age: 55
End: 2025-05-05
Payer: COMMERCIAL

## 2025-05-05 VITALS — DIASTOLIC BLOOD PRESSURE: 81 MMHG | BODY MASS INDEX: 44.17 KG/M2 | SYSTOLIC BLOOD PRESSURE: 118 MMHG | WEIGHT: 275.5 LBS

## 2025-05-05 DIAGNOSIS — Z30.46 ENCOUNTER FOR REMOVAL AND REINSERTION OF NEXPLANON: Primary | ICD-10-CM

## 2025-05-05 PROCEDURE — 11983 REMOVE/INSERT DRUG IMPLANT: CPT | Performed by: OBSTETRICS & GYNECOLOGY

## 2025-05-05 PROCEDURE — 3074F SYST BP LT 130 MM HG: CPT | Performed by: OBSTETRICS & GYNECOLOGY

## 2025-05-05 PROCEDURE — 3079F DIAST BP 80-89 MM HG: CPT | Performed by: OBSTETRICS & GYNECOLOGY

## 2025-05-05 NOTE — PROGRESS NOTES
Subjective  54 year old non-pregnant female presents today to have her Nexplanon removed and another one placed.  Patient denies any vaginal bleeding with this current one in place.  We discussed given her age another Nexplanon, however patient is wanting 1.  We discussed the risks associated with it and she still wants another one.  She states 3 years ago when she had it removed and did not have another 1 placed she had a week long of very heavy vaginal bleeding.  She states she does not want to go through that again.  We discussed she may not given her age, however she wants 1 more and after the 3 years is up with this 1 she will consider not having another one placed.  She has had a tubal ligation.      ROS: 10 point ROS neg other than the symptoms noted above in the HPI.  Past Medical History:   Diagnosis Date    Thyroid disorder      Past Surgical History:   Procedure Laterality Date    COLONOSCOPY N/A 10/14/2021    Procedure: Colonoscopy, Flexible, With Lesion Removal Using Snare;  Surgeon: Carlos Manuel Perez MD;  Location: [facility] OR    COLONOSCOPY WITH CO2 INSUFFLATION N/A 10/14/2021    Procedure: COLONOSCOPY, WITH CO2 INSUFFLATION;  Surgeon: Carlos Manuel Perez MD;  Location: [facility] OR    LAPAROTOMY MINI, TUBAL LIGATION, COMBINED      OTHER SURGICAL HISTORY      cyst on ears      Family History   Problem Relation Age of Onset    No Known Problems Mother     Hypertension Father     Diabetes Type 2  Sister     Schizophrenia Brother         ?schizophrenia, better on meds    Arrhythmia Son         sudden cardiac arrest    Hypertension Sister     Diabetes Brother     Diabetes Brother      Social History     Tobacco Use    Smoking status: Never    Smokeless tobacco: Never   Substance Use Topics    Alcohol use: No         Objective  Vitals: /81 (BP Location: Left arm, Cuff Size: Adult Regular)   Wt 125 kg (275 lb 8 oz)   LMP  (LMP Unknown)   BMI 44.17 kg/m    BMI= Body mass index is 44.17  kg/m .    General appearance=well developed, well-nourished female  Gait=normal  Psych=mood is stable, alert and oriented x3      Procedure Note:  The area is palpated and the implant is present and mobile.  The removal is discribed and she is given the opportunity to ask questions.  She does agree to proceed and the consent is signed,  The area is then cleaned with betadyne and infiltrated with 1% lidocaine with epinephrine.  A small incision is made, overlying the insertional incision.  The implant is brought to the incision and the capsule is opened.  The implant is removed and is intact.  Good hemostasis is noted.    The Nexplanon device is opened and it is confirmed that the jaspreet is in the device.  The Nexplanon device is then placed just under the skin with care not to go too deep.  The device is then slowly removed, leaving the jaspreet behind.  The jaspreet is palpable in the appropriate place under the skin.  The incision is noted to be hemostatic, steri strips and bandaid applied, and the area is wrapped with a 4x4 and tape.      There were no complications and minimal blood loss.      Pregnancy test=not done due to having had a tubal ligation  Nexplanon lot#=[id number] Exp 6/2027   NDC#=35990-049-64      Assessment  1.)  Nexplanon in place      Plan  1.)  Remove and replace Nexplanon    One undiagnosed new problem with uncertain prognosis and interpretation of ultrasound findings ordered by a different provider.  Acute, uncomplicated illness or injury and interpretation of ultrasound findings ordered by a different provider.  Nursing notes read and reviewed    Martha Lawler DO

## 2025-06-03 DIAGNOSIS — E03.9 ACQUIRED HYPOTHYROIDISM: ICD-10-CM

## 2025-06-03 RX ORDER — LEVOTHYROXINE SODIUM 112 UG/1
TABLET ORAL
Qty: 30 TABLET | Refills: 0 | Status: SHIPPED | OUTPATIENT
Start: 2025-06-03

## 2025-06-03 NOTE — TELEPHONE ENCOUNTER
FYI - Status Update    Who is Calling: patient    Update: Patient returned call and given message from below. Patient is scheduled for soonest office visit with Katya Bertrand on 7/8/25    Does caller want a call/response back: No

## 2025-06-03 NOTE — TELEPHONE ENCOUNTER
Has not been seen since 5/13/24. No appointment scheduled, already gave veronica refill. Veronica refill x2 given.     Cedrick Aldana-LEXIE Bertrand  [facility]

## 2025-07-08 ENCOUNTER — OFFICE VISIT (OUTPATIENT)
Dept: FAMILY MEDICINE | Facility: OTHER | Age: 55
End: 2025-07-08
Payer: COMMERCIAL

## 2025-07-08 VITALS
HEIGHT: 66 IN | HEART RATE: 77 BPM | TEMPERATURE: 98.1 F | WEIGHT: 275 LBS | RESPIRATION RATE: 18 BRPM | OXYGEN SATURATION: 97 % | DIASTOLIC BLOOD PRESSURE: 72 MMHG | BODY MASS INDEX: 44.2 KG/M2 | SYSTOLIC BLOOD PRESSURE: 120 MMHG

## 2025-07-08 DIAGNOSIS — Z13.220 SCREENING FOR LIPID DISORDERS: ICD-10-CM

## 2025-07-08 DIAGNOSIS — E66.01 MORBID OBESITY (H): ICD-10-CM

## 2025-07-08 DIAGNOSIS — R73.03 PREDIABETES: ICD-10-CM

## 2025-07-08 DIAGNOSIS — E03.9 ACQUIRED HYPOTHYROIDISM: Primary | ICD-10-CM

## 2025-07-08 LAB
ALBUMIN SERPL BCG-MCNC: 4.2 G/DL (ref 3.5–5.2)
ALP SERPL-CCNC: 57 U/L (ref 40–150)
ALT SERPL W P-5'-P-CCNC: 20 U/L (ref 0–50)
ANION GAP SERPL CALCULATED.3IONS-SCNC: 13 MMOL/L (ref 7–15)
AST SERPL W P-5'-P-CCNC: 28 U/L (ref 0–45)
BILIRUB SERPL-MCNC: 0.5 MG/DL
BUN SERPL-MCNC: 10.7 MG/DL (ref 6–20)
CALCIUM SERPL-MCNC: 9.4 MG/DL (ref 8.8–10.4)
CHLORIDE SERPL-SCNC: 105 MMOL/L (ref 98–107)
CHOLEST SERPL-MCNC: 138 MG/DL
CREAT SERPL-MCNC: 0.82 MG/DL (ref 0.51–0.95)
EGFRCR SERPLBLD CKD-EPI 2021: 85 ML/MIN/1.73M2
EST. AVERAGE GLUCOSE BLD GHB EST-MCNC: 128 MG/DL
FASTING STATUS PATIENT QL REPORTED: YES
FASTING STATUS PATIENT QL REPORTED: YES
GLUCOSE SERPL-MCNC: 90 MG/DL (ref 70–99)
HBA1C MFR BLD: 6.1 % (ref 0–5.6)
HCO3 SERPL-SCNC: 22 MMOL/L (ref 22–29)
HDLC SERPL-MCNC: 43 MG/DL
LDLC SERPL CALC-MCNC: 88 MG/DL
NONHDLC SERPL-MCNC: 95 MG/DL
POTASSIUM SERPL-SCNC: 4.2 MMOL/L (ref 3.4–5.3)
PROT SERPL-MCNC: 8.2 G/DL (ref 6.4–8.3)
SODIUM SERPL-SCNC: 140 MMOL/L (ref 135–145)
TRIGL SERPL-MCNC: 36 MG/DL
TSH SERPL DL<=0.005 MIU/L-ACNC: 1.06 UIU/ML (ref 0.3–4.2)

## 2025-07-08 PROCEDURE — 1126F AMNT PAIN NOTED NONE PRSNT: CPT | Performed by: PHYSICIAN ASSISTANT

## 2025-07-08 PROCEDURE — 3078F DIAST BP <80 MM HG: CPT | Performed by: PHYSICIAN ASSISTANT

## 2025-07-08 PROCEDURE — G2211 COMPLEX E/M VISIT ADD ON: HCPCS | Performed by: PHYSICIAN ASSISTANT

## 2025-07-08 PROCEDURE — 80061 LIPID PANEL: CPT | Performed by: PHYSICIAN ASSISTANT

## 2025-07-08 PROCEDURE — 3074F SYST BP LT 130 MM HG: CPT | Performed by: PHYSICIAN ASSISTANT

## 2025-07-08 PROCEDURE — 99214 OFFICE O/P EST MOD 30 MIN: CPT | Performed by: PHYSICIAN ASSISTANT

## 2025-07-08 PROCEDURE — 83036 HEMOGLOBIN GLYCOSYLATED A1C: CPT | Performed by: PHYSICIAN ASSISTANT

## 2025-07-08 PROCEDURE — 84443 ASSAY THYROID STIM HORMONE: CPT | Performed by: PHYSICIAN ASSISTANT

## 2025-07-08 PROCEDURE — 36415 COLL VENOUS BLD VENIPUNCTURE: CPT | Performed by: PHYSICIAN ASSISTANT

## 2025-07-08 PROCEDURE — 80053 COMPREHEN METABOLIC PANEL: CPT | Performed by: PHYSICIAN ASSISTANT

## 2025-07-08 PROCEDURE — 3044F HG A1C LEVEL LT 7.0%: CPT | Performed by: PHYSICIAN ASSISTANT

## 2025-07-08 RX ORDER — LEVOTHYROXINE SODIUM 112 UG/1
112 TABLET ORAL
Qty: 90 TABLET | Refills: 3 | Status: SHIPPED | OUTPATIENT
Start: 2025-07-08

## 2025-07-08 RX ORDER — METFORMIN HYDROCHLORIDE 500 MG/1
500 TABLET, EXTENDED RELEASE ORAL
Qty: 90 TABLET | Refills: 3 | Status: SHIPPED | OUTPATIENT
Start: 2025-07-08

## 2025-07-08 ASSESSMENT — PAIN SCALES - GENERAL: PAINLEVEL_OUTOF10: NO PAIN (0)

## 2025-07-08 NOTE — PROGRESS NOTES
"    ICD-10-CM    1. Acquired hypothyroidism  E03.9 Comprehensive metabolic panel (BMP + Alb, Alk Phos, ALT, AST, Total. Bili, TP)     TSH with free T4 reflex     Comprehensive metabolic panel (BMP + Alb, Alk Phos, ALT, AST, Total. Bili, TP)     TSH with free T4 reflex     levothyroxine (SYNTHROID/LEVOTHROID) 112 MCG tablet      2. Prediabetes  R73.03 Hemoglobin A1c     Comprehensive metabolic panel (BMP + Alb, Alk Phos, ALT, AST, Total. Bili, TP)     Hemoglobin A1c     Comprehensive metabolic panel (BMP + Alb, Alk Phos, ALT, AST, Total. Bili, TP)     metFORMIN (GLUCOPHAGE XR) 500 MG 24 hr tablet      3. Screening for lipid disorders  Z13.220 Lipid panel reflex to direct LDL Fasting     Lipid panel reflex to direct LDL Fasting      4. Morbid obesity (H)  E66.01          #Acquired hypothyroidism  No complaints/changes. Feeling stable   - Will update labs today  - Results will be communicated to patient when available and appropriate medication changes made if necessary   - Continue synthroid, reviewed medication use and side effects, refilled     #Prediabetes  A1c 6.1 today. Unchanged from 6 months ago 6.0. At goal. However did stop Metformin in April as states she ran out. Will restart Metformin daily.   - Metformin 500 mg daily, reviewed use and side effects, refilled   - Encouraged healthy habits including weight loss and exercises     #Screening for lipid disorders  - Lipid panel done today, last checked in 2022 with no elevation   - Await results     4. BMI  Estimated body mass index is 44.09 kg/m  as calculated from the following:    Height as of this encounter: 1.682 m (5' 6.22\").    Weight as of this encounter: 124.7 kg (275 lb).   Weight management plan: Discussed healthy diet and exercise guidelines  - Discussed how weight loss would improve all above      The patient indicates understanding of these issues and agrees with the plan.    Follow up: 6 months for prediabetes recheck and A1C    Cedrick NOLASCO " LEXIE Wheeler,  was present with the PA student who participated in the service and in the documentation of the note.  I have verified the history and personally performed the physical exam and medical decision making.  I agree with the assessment and plan of care as documented in the note.     LENY Moore   [facility]     Cedrick LEXIE Wheeler  [facility]       Subjective           7/8/2025     9:35 AM   Additional Questions   Roomed by venkat   Accompanied by self     History of Present Illness       Reason for visit:  Medication follow up.    She eats 2-3 servings of fruits and vegetables daily.She consumes 0 sweetened beverage(s) daily.She exercises with enough effort to increase her heart rate 10 to 19 minutes per day.  She exercises with enough effort to increase her heart rate 4 days per week.   She is taking medications regularly.        Medication Followup of Levothyroxine 112mcg  Taking Medication as prescribed: yes  Side Effects:  None  Medication Helping Symptoms:  yes    Hypothyroidism Follow-up    Since last visit, patient describes the following symptoms: Weight stable, no hair loss, no skin changes, no constipation, no loose stools    Jerrica is a 55 year old, presenting for the following health issues: Metformin check    Patient states she ran out of Metformin in April. She was taking it previously everyday with mild nausea and diarrhea. After taking it consistently, her side effects subsided.     Taking synthroid as directed. No complaints/side effects/changes.     Denies any recent illnesses, headaches, visual changes, myalgias, peripheral neuropathy.    She is continuing to make helathy dietary changes and eats a wide variety of meats, vegetables, and rice. She is trying to get more activity and is now taking daily walks.     Last eye exam in 2024.           Objective    /72   Pulse 77   Temp 98.1  F (36.7  C) (Temporal)   Resp 18  "  Ht 1.682 m (5' 6.22\")   Wt 124.7 kg (275 lb)   SpO2 97%   BMI 44.09 kg/m    Body mass index is 44.09 kg/m .    Physical Exam  Vitals reviewed.   Constitutional:       Appearance: Normal appearance.   HENT:      Head: Normocephalic and atraumatic.   Eyes:      Extraocular Movements: Extraocular movements intact.      Pupils: Pupils are equal, round, and reactive to light.   Cardiovascular:      Rate and Rhythm: Normal rate and regular rhythm.   Pulmonary:      Effort: Pulmonary effort is normal.      Breath sounds: Normal breath sounds.   Neurological:      General: No focal deficit present.      Mental Status: She is alert and oriented to person, place, and time.         Diagnostics: reviewed in Epic, see orders pending in Epic     Hemoglobin A1C   Date Value Ref Range Status   07/08/2025 6.1 (H) 0.0 - 5.6 % Final     Comment:     Normal <5.7%   Prediabetes 5.7-6.4%    Diabetes 6.5% or higher     Note: Adopted from ADA consensus guidelines.   01/25/2018 5.9 4.3 - 6.0 % Final         "

## (undated) DEVICE — PREP CHLORAPREP 26ML TINTED ORANGE  260815

## (undated) DEVICE — KIT ENDO FIRST STEP DISINFECTANT 200ML W/POUCH EP-4

## (undated) DEVICE — PAD CHUX UNDERPAD 23X24" 7136

## (undated) RX ORDER — EPHEDRINE SULFATE 50 MG/ML
INJECTION, SOLUTION INTRAMUSCULAR; INTRAVENOUS; SUBCUTANEOUS
Status: DISPENSED
Start: 2021-10-14

## (undated) RX ORDER — AMINOPHYLLINE 25 MG/ML
INJECTION, SOLUTION INTRAVENOUS
Status: DISPENSED
Start: 2020-11-03

## (undated) RX ORDER — REGADENOSON 0.08 MG/ML
INJECTION, SOLUTION INTRAVENOUS
Status: DISPENSED
Start: 2020-11-03